# Patient Record
Sex: MALE | Race: WHITE | NOT HISPANIC OR LATINO | Employment: OTHER | URBAN - METROPOLITAN AREA
[De-identification: names, ages, dates, MRNs, and addresses within clinical notes are randomized per-mention and may not be internally consistent; named-entity substitution may affect disease eponyms.]

---

## 2021-04-14 DIAGNOSIS — Z23 ENCOUNTER FOR IMMUNIZATION: ICD-10-CM

## 2021-08-24 ENCOUNTER — APPOINTMENT (OUTPATIENT)
Dept: PREADMISSION TESTING | Facility: HOSPITAL | Age: 86
End: 2021-08-24
Payer: MEDICARE

## 2021-08-26 ENCOUNTER — APPOINTMENT (OUTPATIENT)
Dept: PREADMISSION TESTING | Facility: HOSPITAL | Age: 86
End: 2021-08-26
Payer: MEDICARE

## 2021-08-26 VITALS — BODY MASS INDEX: 21.67 KG/M2 | WEIGHT: 160 LBS | HEIGHT: 72 IN

## 2021-08-26 RX ORDER — PHENOL/SODIUM PHENOLATE
20 AEROSOL, SPRAY (ML) MUCOUS MEMBRANE
COMMUNITY

## 2021-08-26 RX ORDER — LISINOPRIL 10 MG/1
10 TABLET ORAL EVERY EVENING
COMMUNITY

## 2021-08-26 RX ORDER — AMLODIPINE BESYLATE 5 MG/1
5 TABLET ORAL NIGHTLY
COMMUNITY

## 2021-08-26 RX ORDER — ZOLPIDEM TARTRATE 10 MG/1
10 TABLET ORAL NIGHTLY
COMMUNITY

## 2021-08-26 RX ORDER — ALPRAZOLAM 0.5 MG/1
0.5 TABLET ORAL NIGHTLY
COMMUNITY

## 2021-08-26 RX ORDER — TRAMADOL HYDROCHLORIDE 50 MG/1
50 TABLET ORAL EVERY 6 HOURS PRN
COMMUNITY

## 2021-08-27 ENCOUNTER — ANESTHESIA EVENT (OUTPATIENT)
Dept: OPERATING ROOM | Facility: HOSPITAL | Age: 86
Setting detail: HOSPITAL OUTPATIENT SURGERY
DRG: 470 | End: 2021-08-27
Payer: MEDICARE

## 2021-08-27 ENCOUNTER — APPOINTMENT (OUTPATIENT)
Dept: PREADMISSION TESTING | Facility: HOSPITAL | Age: 86
End: 2021-08-27
Attending: PHYSICIAN ASSISTANT
Payer: MEDICARE

## 2021-08-27 ENCOUNTER — APPOINTMENT (OUTPATIENT)
Dept: LAB | Facility: HOSPITAL | Age: 86
End: 2021-08-27
Attending: PHYSICIAN ASSISTANT
Payer: MEDICARE

## 2021-08-27 ENCOUNTER — TRANSCRIBE ORDERS (OUTPATIENT)
Dept: REGISTRATION | Facility: HOSPITAL | Age: 86
End: 2021-08-27

## 2021-08-27 DIAGNOSIS — Z11.59 ENCOUNTER FOR SCREENING FOR OTHER VIRAL DISEASES: ICD-10-CM

## 2021-08-27 DIAGNOSIS — Z11.59 ENCOUNTER FOR SCREENING FOR OTHER VIRAL DISEASES: Primary | ICD-10-CM

## 2021-08-27 DIAGNOSIS — Z01.818 ENCOUNTER FOR OTHER PREPROCEDURAL EXAMINATION: ICD-10-CM

## 2021-08-27 LAB
ABO + RH BLD: NORMAL
ALBUMIN SERPL-MCNC: 3.8 G/DL (ref 3.4–5)
ALP SERPL-CCNC: 77 IU/L (ref 35–126)
ALT SERPL-CCNC: 23 IU/L (ref 16–63)
ANION GAP SERPL CALC-SCNC: 10 MEQ/L (ref 3–15)
APTT PPP: 30 SEC (ref 23–35)
AST SERPL-CCNC: 30 IU/L (ref 15–41)
BILIRUB SERPL-MCNC: 1.1 MG/DL (ref 0.3–1.2)
BLD GP AB SCN SERPL QL: NEGATIVE
BLOOD BANK CMNT PATIENT-IMP: NORMAL
BUN SERPL-MCNC: 15 MG/DL (ref 8–20)
CALCIUM SERPL-MCNC: 9.5 MG/DL (ref 8.9–10.3)
CHLORIDE SERPL-SCNC: 102 MEQ/L (ref 98–109)
CO2 SERPL-SCNC: 22 MEQ/L (ref 22–32)
CREAT SERPL-MCNC: 0.9 MG/DL (ref 0.8–1.3)
D AG BLD QL: POSITIVE
ERYTHROCYTE [DISTWIDTH] IN BLOOD BY AUTOMATED COUNT: 13.6 % (ref 11.6–14.4)
EST. AVERAGE GLUCOSE BLD GHB EST-MCNC: 103 MG/DL
GFR SERPL CREATININE-BSD FRML MDRD: >60 ML/MIN/1.73M*2
GLUCOSE SERPL-MCNC: 87 MG/DL (ref 70–99)
HBA1C MFR BLD HPLC: 5.2 %
HCT VFR BLDCO AUTO: 44.3 % (ref 40.1–51)
HGB BLD-MCNC: 15 G/DL (ref 13.7–17.5)
INR PPP: 1
LABORATORY COMMENT REPORT: NORMAL
MCH RBC QN AUTO: 32.5 PG (ref 28–33.2)
MCHC RBC AUTO-ENTMCNC: 33.9 G/DL (ref 32.2–36.5)
MCV RBC AUTO: 96.1 FL (ref 83–98)
PDW BLD AUTO: 11.3 FL (ref 9.4–12.4)
PLATELET # BLD AUTO: 191 K/UL (ref 150–350)
POTASSIUM SERPL-SCNC: 4.7 MEQ/L (ref 3.6–5.1)
PROT SERPL-MCNC: 6 G/DL (ref 6–8.2)
PROTHROMBIN TIME: 13.1 SEC (ref 12.2–14.5)
RBC # BLD AUTO: 4.61 M/UL (ref 4.5–5.8)
SARS-COV-2 RNA RESP QL NAA+PROBE: NEGATIVE
SODIUM SERPL-SCNC: 134 MEQ/L (ref 136–144)
SPECIMEN EXP DATE BLD: NORMAL
WBC # BLD AUTO: 4.7 K/UL (ref 3.8–10.5)

## 2021-08-27 PROCEDURE — 83036 HEMOGLOBIN GLYCOSYLATED A1C: CPT | Mod: GA

## 2021-08-27 PROCEDURE — 85610 PROTHROMBIN TIME: CPT | Mod: GA

## 2021-08-27 PROCEDURE — 36415 COLL VENOUS BLD VENIPUNCTURE: CPT

## 2021-08-27 PROCEDURE — 85730 THROMBOPLASTIN TIME PARTIAL: CPT | Mod: GA

## 2021-08-27 PROCEDURE — 86850 RBC ANTIBODY SCREEN: CPT

## 2021-08-27 PROCEDURE — 80053 COMPREHEN METABOLIC PANEL: CPT

## 2021-08-27 PROCEDURE — U0003 INFECTIOUS AGENT DETECTION BY NUCLEIC ACID (DNA OR RNA); SEVERE ACUTE RESPIRATORY SYNDROME CORONAVIRUS 2 (SARS-COV-2) (CORONAVIRUS DISEASE [COVID-19]), AMPLIFIED PROBE TECHNIQUE, MAKING USE OF HIGH THROUGHPUT TECHNOLOGIES AS DESCRIBED BY CMS-2020-01-R: HCPCS

## 2021-08-27 PROCEDURE — 85027 COMPLETE CBC AUTOMATED: CPT

## 2021-08-31 ENCOUNTER — HOSPITAL ENCOUNTER (INPATIENT)
Facility: HOSPITAL | Age: 86
LOS: 1 days | Discharge: HOME HEALTH | DRG: 470 | End: 2021-09-02
Attending: ORTHOPAEDIC SURGERY | Admitting: ORTHOPAEDIC SURGERY
Payer: MEDICARE

## 2021-08-31 ENCOUNTER — APPOINTMENT (OUTPATIENT)
Dept: RADIOLOGY | Facility: HOSPITAL | Age: 86
DRG: 470 | End: 2021-08-31
Attending: ORTHOPAEDIC SURGERY
Payer: MEDICARE

## 2021-08-31 ENCOUNTER — ANESTHESIA (OUTPATIENT)
Dept: OPERATING ROOM | Facility: HOSPITAL | Age: 86
Setting detail: HOSPITAL OUTPATIENT SURGERY
DRG: 470 | End: 2021-08-31
Payer: MEDICARE

## 2021-08-31 LAB
ABO + RH BLD: NORMAL
D AG BLD QL: POSITIVE
LABORATORY COMMENT REPORT: NORMAL

## 2021-08-31 PROCEDURE — 63600000 HC DRUGS/DETAIL CODE: Performed by: ORTHOPAEDIC SURGERY

## 2021-08-31 PROCEDURE — 25000000 HC PHARMACY GENERAL: Performed by: ORTHOPAEDIC SURGERY

## 2021-08-31 PROCEDURE — 25000000 HC PHARMACY GENERAL: Performed by: ANESTHESIOLOGY

## 2021-08-31 PROCEDURE — 0SR90JZ REPLACEMENT OF RIGHT HIP JOINT WITH SYNTHETIC SUBSTITUTE, OPEN APPROACH: ICD-10-PCS | Performed by: ORTHOPAEDIC SURGERY

## 2021-08-31 PROCEDURE — 63700000 HC SELF-ADMINISTRABLE DRUG: Performed by: ORTHOPAEDIC SURGERY

## 2021-08-31 PROCEDURE — 36000005 HC OR LEVEL 5 INITIAL 30MIN: Performed by: ORTHOPAEDIC SURGERY

## 2021-08-31 PROCEDURE — 27200000 HC STERILE SUPPLY: Performed by: ORTHOPAEDIC SURGERY

## 2021-08-31 PROCEDURE — 71000001 HC PACU PHASE 1 INITIAL 30MIN: Performed by: ORTHOPAEDIC SURGERY

## 2021-08-31 PROCEDURE — 36415 COLL VENOUS BLD VENIPUNCTURE: CPT | Performed by: ORTHOPAEDIC SURGERY

## 2021-08-31 PROCEDURE — 25800000 HC PHARMACY IV SOLUTIONS: Performed by: ORTHOPAEDIC SURGERY

## 2021-08-31 PROCEDURE — 71000011 HC PACU PHASE 1 EA ADDL MIN: Performed by: ORTHOPAEDIC SURGERY

## 2021-08-31 PROCEDURE — C1776 JOINT DEVICE (IMPLANTABLE): HCPCS | Performed by: ORTHOPAEDIC SURGERY

## 2021-08-31 PROCEDURE — C1713 ANCHOR/SCREW BN/BN,TIS/BN: HCPCS | Performed by: ORTHOPAEDIC SURGERY

## 2021-08-31 PROCEDURE — 36000015 HC OR LEVEL 5 EA ADDL MIN: Performed by: ORTHOPAEDIC SURGERY

## 2021-08-31 PROCEDURE — 37000002 HC ANESTHESIA MAC: Performed by: ORTHOPAEDIC SURGERY

## 2021-08-31 PROCEDURE — 63700000 HC SELF-ADMINISTRABLE DRUG: Performed by: HOSPITALIST

## 2021-08-31 PROCEDURE — 72170 X-RAY EXAM OF PELVIS: CPT

## 2021-08-31 PROCEDURE — 97162 PT EVAL MOD COMPLEX 30 MIN: CPT | Mod: GP

## 2021-08-31 PROCEDURE — 63600000 HC DRUGS/DETAIL CODE: Mod: JW | Performed by: NURSE ANESTHETIST, CERTIFIED REGISTERED

## 2021-08-31 PROCEDURE — 25000000 HC PHARMACY GENERAL: Performed by: NURSE ANESTHETIST, CERTIFIED REGISTERED

## 2021-08-31 DEVICE — PINNACLE HIP SOLUTIONS ALTRX LD POLYETHYLENE ACETABULAR LINER +4 10 DEGREE 40MM ID 56MM OD
Type: IMPLANTABLE DEVICE | Site: HIP | Status: FUNCTIONAL
Brand: PINNACLE ALTRX

## 2021-08-31 DEVICE — CORAIL HIP SYSTEM CEMENTLESS FEMORAL STEM 12/14 AMT 125 DEGREES KLA SIZE 15 HA COATED HIGH OFFSET COLLAR
Type: IMPLANTABLE DEVICE | Site: HIP | Status: FUNCTIONAL
Brand: CORAIL

## 2021-08-31 DEVICE — M-SPEC METAL FEMORAL HEAD 12/14 TAPER DIAMETER 40MM +1.5 OFFSET: Type: IMPLANTABLE DEVICE | Site: HIP | Status: FUNCTIONAL

## 2021-08-31 DEVICE — PINNACLE CANCELLOUS BONE SCREW 6.5MM X 30MM
Type: IMPLANTABLE DEVICE | Site: HIP | Status: FUNCTIONAL
Brand: PINNACLE

## 2021-08-31 DEVICE — PINNACLE POROCOAT ACETABULAR SHELL SECTOR II 56MM OD
Type: IMPLANTABLE DEVICE | Site: HIP | Status: FUNCTIONAL
Brand: PINNACLE POROCOAT

## 2021-08-31 RX ORDER — AMOXICILLIN 250 MG
1 CAPSULE ORAL 2 TIMES DAILY
Status: DISCONTINUED | OUTPATIENT
Start: 2021-08-31 | End: 2021-09-02 | Stop reason: HOSPADM

## 2021-08-31 RX ORDER — BUPIVACAINE HYDROCHLORIDE 7.5 MG/ML
INJECTION, SOLUTION INTRASPINAL
Status: COMPLETED | OUTPATIENT
Start: 2021-08-31 | End: 2021-08-31

## 2021-08-31 RX ORDER — FAMOTIDINE 20 MG/1
20 TABLET, FILM COATED ORAL
Status: COMPLETED | OUTPATIENT
Start: 2021-08-31 | End: 2021-08-31

## 2021-08-31 RX ORDER — IBUPROFEN 200 MG
16-32 TABLET ORAL AS NEEDED
Status: DISCONTINUED | OUTPATIENT
Start: 2021-08-31 | End: 2021-08-31 | Stop reason: HOSPADM

## 2021-08-31 RX ORDER — ONDANSETRON HYDROCHLORIDE 2 MG/ML
4 INJECTION, SOLUTION INTRAVENOUS EVERY 8 HOURS PRN
Status: DISCONTINUED | OUTPATIENT
Start: 2021-08-31 | End: 2021-09-02 | Stop reason: HOSPADM

## 2021-08-31 RX ORDER — HYDROMORPHONE HYDROCHLORIDE 1 MG/ML
0.25 INJECTION, SOLUTION INTRAMUSCULAR; INTRAVENOUS; SUBCUTANEOUS EVERY 4 HOURS PRN
Status: DISCONTINUED | OUTPATIENT
Start: 2021-08-31 | End: 2021-09-01

## 2021-08-31 RX ORDER — DEXTROSE 50 % IN WATER (D50W) INTRAVENOUS SYRINGE
25 AS NEEDED
Status: DISCONTINUED | OUTPATIENT
Start: 2021-08-31 | End: 2021-09-02 | Stop reason: HOSPADM

## 2021-08-31 RX ORDER — DEXTROSE 40 %
15-30 GEL (GRAM) ORAL AS NEEDED
Status: DISCONTINUED | OUTPATIENT
Start: 2021-08-31 | End: 2021-08-31 | Stop reason: HOSPADM

## 2021-08-31 RX ORDER — FENTANYL CITRATE 50 UG/ML
INJECTION, SOLUTION INTRAMUSCULAR; INTRAVENOUS AS NEEDED
Status: DISCONTINUED | OUTPATIENT
Start: 2021-08-31 | End: 2021-08-31 | Stop reason: SURG

## 2021-08-31 RX ORDER — CEFAZOLIN SODIUM/WATER 2 G/20 ML
2 SYRINGE (ML) INTRAVENOUS
Status: COMPLETED | OUTPATIENT
Start: 2021-08-31 | End: 2021-08-31

## 2021-08-31 RX ORDER — IBUPROFEN 200 MG
16-32 TABLET ORAL AS NEEDED
Status: DISCONTINUED | OUTPATIENT
Start: 2021-08-31 | End: 2021-09-02 | Stop reason: HOSPADM

## 2021-08-31 RX ORDER — OXYCODONE HYDROCHLORIDE 5 MG/1
5-10 TABLET ORAL EVERY 4 HOURS PRN
Status: DISCONTINUED | OUTPATIENT
Start: 2021-08-31 | End: 2021-09-01

## 2021-08-31 RX ORDER — ALUMINUM HYDROXIDE, MAGNESIUM HYDROXIDE, AND SIMETHICONE 1200; 120; 1200 MG/30ML; MG/30ML; MG/30ML
30 SUSPENSION ORAL EVERY 4 HOURS PRN
Status: DISCONTINUED | OUTPATIENT
Start: 2021-08-31 | End: 2021-09-02 | Stop reason: HOSPADM

## 2021-08-31 RX ORDER — PHENYLEPHRINE HYDROCHLORIDE 10 MG/ML
INJECTION INTRAVENOUS AS NEEDED
Status: DISCONTINUED | OUTPATIENT
Start: 2021-08-31 | End: 2021-08-31 | Stop reason: SURG

## 2021-08-31 RX ORDER — DEXTROSE 40 %
15-30 GEL (GRAM) ORAL AS NEEDED
Status: DISCONTINUED | OUTPATIENT
Start: 2021-08-31 | End: 2021-09-02 | Stop reason: HOSPADM

## 2021-08-31 RX ORDER — SODIUM CHLORIDE 0.9 G/100ML
INJECTION, SOLUTION IRRIGATION AS NEEDED
Status: DISCONTINUED | OUTPATIENT
Start: 2021-08-31 | End: 2021-08-31 | Stop reason: HOSPADM

## 2021-08-31 RX ORDER — ONDANSETRON 8 MG/1
8 TABLET, ORALLY DISINTEGRATING ORAL
Status: COMPLETED | OUTPATIENT
Start: 2021-08-31 | End: 2021-08-31

## 2021-08-31 RX ORDER — PREGABALIN 150 MG/1
150 CAPSULE ORAL
Status: COMPLETED | OUTPATIENT
Start: 2021-08-31 | End: 2021-08-31

## 2021-08-31 RX ORDER — OXYCODONE HCL 20 MG/1
20 TABLET, FILM COATED, EXTENDED RELEASE ORAL
Status: COMPLETED | OUTPATIENT
Start: 2021-08-31 | End: 2021-08-31

## 2021-08-31 RX ORDER — PANTOPRAZOLE SODIUM 20 MG/1
20 TABLET, DELAYED RELEASE ORAL DAILY
Status: DISCONTINUED | OUTPATIENT
Start: 2021-09-01 | End: 2021-09-02 | Stop reason: HOSPADM

## 2021-08-31 RX ORDER — FENTANYL CITRATE 50 UG/ML
50 INJECTION, SOLUTION INTRAMUSCULAR; INTRAVENOUS
Status: DISCONTINUED | OUTPATIENT
Start: 2021-08-31 | End: 2021-08-31 | Stop reason: HOSPADM

## 2021-08-31 RX ORDER — HYDROMORPHONE HYDROCHLORIDE 1 MG/ML
0.5 INJECTION, SOLUTION INTRAMUSCULAR; INTRAVENOUS; SUBCUTANEOUS
Status: DISCONTINUED | OUTPATIENT
Start: 2021-08-31 | End: 2021-08-31 | Stop reason: HOSPADM

## 2021-08-31 RX ORDER — SODIUM CHLORIDE 9 MG/ML
INJECTION, SOLUTION INTRAVENOUS CONTINUOUS
Status: DISCONTINUED | OUTPATIENT
Start: 2021-08-31 | End: 2021-09-02 | Stop reason: HOSPADM

## 2021-08-31 RX ORDER — PROPOFOL 10 MG/ML
INJECTION, EMULSION INTRAVENOUS CONTINUOUS PRN
Status: DISCONTINUED | OUTPATIENT
Start: 2021-08-31 | End: 2021-08-31 | Stop reason: SURG

## 2021-08-31 RX ORDER — DEXTROSE 50 % IN WATER (D50W) INTRAVENOUS SYRINGE
25 AS NEEDED
Status: DISCONTINUED | OUTPATIENT
Start: 2021-08-31 | End: 2021-08-31 | Stop reason: HOSPADM

## 2021-08-31 RX ORDER — DEXAMETHASONE SODIUM PHOSPHATE 4 MG/ML
10 INJECTION, SOLUTION INTRA-ARTICULAR; INTRALESIONAL; INTRAMUSCULAR; INTRAVENOUS; SOFT TISSUE DAILY
Status: COMPLETED | OUTPATIENT
Start: 2021-08-31 | End: 2021-09-02

## 2021-08-31 RX ORDER — SODIUM CHLORIDE 9 MG/ML
INJECTION, SOLUTION INTRAVENOUS CONTINUOUS
Status: ACTIVE | OUTPATIENT
Start: 2021-08-31 | End: 2021-09-01

## 2021-08-31 RX ORDER — TRAMADOL HYDROCHLORIDE 50 MG/1
50 TABLET ORAL 4 TIMES DAILY
Status: DISCONTINUED | OUTPATIENT
Start: 2021-08-31 | End: 2021-09-02 | Stop reason: HOSPADM

## 2021-08-31 RX ORDER — CELECOXIB 200 MG/1
400 CAPSULE ORAL
Status: COMPLETED | OUTPATIENT
Start: 2021-08-31 | End: 2021-08-31

## 2021-08-31 RX ORDER — POLYETHYLENE GLYCOL 3350 17 G/17G
17 POWDER, FOR SOLUTION ORAL DAILY
Status: DISCONTINUED | OUTPATIENT
Start: 2021-08-31 | End: 2021-09-02 | Stop reason: HOSPADM

## 2021-08-31 RX ORDER — NAPROXEN SODIUM 220 MG/1
81 TABLET, FILM COATED ORAL 2 TIMES DAILY
Status: DISCONTINUED | OUTPATIENT
Start: 2021-08-31 | End: 2021-09-02 | Stop reason: HOSPADM

## 2021-08-31 RX ORDER — LISINOPRIL 10 MG/1
10 TABLET ORAL EVERY EVENING
Status: DISCONTINUED | OUTPATIENT
Start: 2021-08-31 | End: 2021-09-02 | Stop reason: HOSPADM

## 2021-08-31 RX ORDER — ZOLPIDEM TARTRATE 5 MG/1
5 TABLET ORAL NIGHTLY PRN
Status: DISCONTINUED | OUTPATIENT
Start: 2021-08-31 | End: 2021-09-02 | Stop reason: HOSPADM

## 2021-08-31 RX ORDER — ALPRAZOLAM 0.5 MG/1
0.5 TABLET ORAL NIGHTLY
Status: DISCONTINUED | OUTPATIENT
Start: 2021-08-31 | End: 2021-09-02 | Stop reason: HOSPADM

## 2021-08-31 RX ORDER — AMLODIPINE BESYLATE 5 MG/1
5 TABLET ORAL NIGHTLY
Status: DISCONTINUED | OUTPATIENT
Start: 2021-08-31 | End: 2021-09-02 | Stop reason: HOSPADM

## 2021-08-31 RX ORDER — ONDANSETRON 4 MG/1
4 TABLET, ORALLY DISINTEGRATING ORAL EVERY 8 HOURS PRN
Status: DISCONTINUED | OUTPATIENT
Start: 2021-08-31 | End: 2021-09-02 | Stop reason: HOSPADM

## 2021-08-31 RX ORDER — KETOROLAC TROMETHAMINE 15 MG/ML
15 INJECTION, SOLUTION INTRAMUSCULAR; INTRAVENOUS
Status: DISCONTINUED | OUTPATIENT
Start: 2021-08-31 | End: 2021-09-02 | Stop reason: HOSPADM

## 2021-08-31 RX ADMIN — TRAMADOL HYDROCHLORIDE 50 MG: 50 TABLET, FILM COATED ORAL at 22:57

## 2021-08-31 RX ADMIN — AMLODIPINE BESYLATE 5 MG: 5 TABLET ORAL at 22:57

## 2021-08-31 RX ADMIN — ONDANSETRON 8 MG: 8 TABLET, ORALLY DISINTEGRATING ORAL at 11:10

## 2021-08-31 RX ADMIN — CEFAZOLIN SODIUM 2 G: 10 POWDER, FOR SOLUTION INTRAVENOUS at 21:52

## 2021-08-31 RX ADMIN — PHENYLEPHRINE HYDROCHLORIDE 200 MCG: 10 INJECTION INTRAVENOUS at 13:20

## 2021-08-31 RX ADMIN — HYDROMORPHONE HYDROCHLORIDE 0.25 MG: 1 INJECTION, SOLUTION INTRAMUSCULAR; INTRAVENOUS; SUBCUTANEOUS at 20:38

## 2021-08-31 RX ADMIN — PHENYLEPHRINE HYDROCHLORIDE 200 MCG: 10 INJECTION INTRAVENOUS at 13:50

## 2021-08-31 RX ADMIN — SODIUM CHLORIDE: 9 INJECTION, SOLUTION INTRAVENOUS at 11:11

## 2021-08-31 RX ADMIN — SODIUM CHLORIDE: 9 INJECTION, SOLUTION INTRAVENOUS at 14:58

## 2021-08-31 RX ADMIN — KETOROLAC TROMETHAMINE 15 MG: 15 INJECTION, SOLUTION INTRAMUSCULAR; INTRAVENOUS at 17:14

## 2021-08-31 RX ADMIN — PREGABALIN 150 MG: 150 CAPSULE ORAL at 11:10

## 2021-08-31 RX ADMIN — POLYETHYLENE GLYCOL 3350 17 G: 17 POWDER, FOR SOLUTION ORAL at 20:38

## 2021-08-31 RX ADMIN — ALPRAZOLAM 0.5 MG: 0.5 TABLET ORAL at 22:57

## 2021-08-31 RX ADMIN — FAMOTIDINE 20 MG: 20 TABLET ORAL at 11:10

## 2021-08-31 RX ADMIN — BUPIVACAINE HYDROCHLORIDE IN DEXTROSE 1.6 ML: 7.5 INJECTION, SOLUTION SUBARACHNOID at 13:00

## 2021-08-31 RX ADMIN — CELECOXIB 400 MG: 200 CAPSULE ORAL at 11:10

## 2021-08-31 RX ADMIN — VANCOMYCIN HYDROCHLORIDE 1 G: 1 INJECTION, POWDER, LYOPHILIZED, FOR SOLUTION INTRAVENOUS at 22:57

## 2021-08-31 RX ADMIN — TRANEXAMIC ACID 1400 MG: 100 INJECTION, SOLUTION INTRAVENOUS at 12:54

## 2021-08-31 RX ADMIN — FENTANYL CITRATE 50 MCG: 50 INJECTION INTRAMUSCULAR; INTRAVENOUS at 13:00

## 2021-08-31 RX ADMIN — PHENYLEPHRINE HYDROCHLORIDE 200 MCG: 10 INJECTION INTRAVENOUS at 13:40

## 2021-08-31 RX ADMIN — CEFAZOLIN 2 G: 330 INJECTION, POWDER, FOR SOLUTION INTRAMUSCULAR; INTRAVENOUS at 13:05

## 2021-08-31 RX ADMIN — DOCUSATE SODIUM AND SENNOSIDES 1 TABLET: 8.6; 5 TABLET, FILM COATED ORAL at 20:38

## 2021-08-31 RX ADMIN — DEXAMETHASONE SODIUM PHOSPHATE 10 MG: 4 INJECTION, SOLUTION INTRA-ARTICULAR; INTRALESIONAL; INTRAMUSCULAR; INTRAVENOUS; SOFT TISSUE at 15:42

## 2021-08-31 RX ADMIN — OXYCODONE HYDROCHLORIDE 20 MG: 20 TABLET, FILM COATED, EXTENDED RELEASE ORAL at 11:09

## 2021-08-31 RX ADMIN — VANCOMYCIN HYDROCHLORIDE 1000 MG: 1 INJECTION, POWDER, LYOPHILIZED, FOR SOLUTION INTRAVENOUS at 11:12

## 2021-08-31 RX ADMIN — PHENYLEPHRINE HYDROCHLORIDE 200 MCG: 10 INJECTION INTRAVENOUS at 13:13

## 2021-08-31 RX ADMIN — LISINOPRIL 10 MG: 10 TABLET ORAL at 22:56

## 2021-08-31 RX ADMIN — KETOROLAC TROMETHAMINE 15 MG: 15 INJECTION, SOLUTION INTRAMUSCULAR; INTRAVENOUS at 22:57

## 2021-08-31 RX ADMIN — PROPOFOL 50 MCG/KG/MIN: 10 INJECTION, EMULSION INTRAVENOUS at 13:10

## 2021-08-31 ASSESSMENT — COGNITIVE AND FUNCTIONAL STATUS - GENERAL
WALKING IN HOSPITAL ROOM: 2 - A LOT
MOVING TO AND FROM BED TO CHAIR: 3 - A LITTLE
CLIMB 3 TO 5 STEPS WITH RAILING: 2 - A LOT
AFFECT: WFL
STANDING UP FROM CHAIR USING ARMS: 3 - A LITTLE

## 2021-08-31 ASSESSMENT — PATIENT HEALTH QUESTIONNAIRE - PHQ9: SUM OF ALL RESPONSES TO PHQ9 QUESTIONS 1 & 2: 0

## 2021-08-31 NOTE — PLAN OF CARE
Problem: Adult Inpatient Plan of Care  Goal: Plan of Care Review  Outcome: Progressing  Flowsheets (Taken 8/31/2021 1939)  Progress: improving  Plan of Care Reviewed With: patient  Outcome Summary: PT eval completed. home with home PT     Problem: Joint Function Impaired (Hip Arthroplasty)  Goal: Optimal Functional Ability  Outcome: Progressing

## 2021-08-31 NOTE — NURSING NOTE
Pt arrived on the floor from PACU. Pt alert and oriented. VSS. Afebrile. Neurovascular assessment unchanged. R. Hip dressing remians clean, dry and intact. Pt teaching provided for IS. Bed alarm on.

## 2021-08-31 NOTE — PROGRESS NOTES
Patient: Neno Araujo  Location:  [facility] 4B 4229  MRN:  [id number]  Today's date:  8/31/2021    Pt left supine in bed and resting comfortably with all lines arranged, incontinence pad underneath, SCD's in place, call bell and personal items within reach.Nurse aware of pt's performance and positioning.       Neno is a 88 y.o. male admitted on 8/31/2021 with Osteoarthritis of right hip, unspecified osteoarthritis type [M16.11]  Arthritis of hip [M16.10]. Principal problem is R JOSE.  Past Medical History  Neno has a past medical history of Arthritis, Duodenal ulcer, Hypertension, Osteoarthritis of left hip, and Spinal stenosis, lumbar.    History of Present Illness  8/31/21: right total  hip  replacement. WBAT. Hip precautions    PT Vitals    Date/Time Pulse HR Source Resp SpO2 BP MAP BP Location BP Method Pt Position [facility]   08/31/21 1645 86 -- -- 97 % 136/94 -- Right upper arm Automatic Lying [facility]   08/31/21 1659 100 -- -- 90 % 152/99 -- Right upper arm Automatic Lying [facility]   08/31/21 1700 94 Monitor 62 93 % 156/101 123 mmHg -- -- -- JK      PT Pain    Date/Time Pain Type Rating: Rest Rating: Activity Interventions [facility]   08/31/21 1645 Pain Assessment 3 3 -- [facility]   08/31/21 1659 Pain Assessment 3 3 -- [facility]   08/31/21 1700 Pain Assessment 3 3 medication offered but refused;pain management plan reviewed with patient/caregiver;quiet environment facilitated;relaxation techniques promoted JK          Prior Living Environment      Most Recent Value   Current Living Arrangements  home/apartment/condo   Living Environment Comment  Pt reports living in 2 story house with spouse 0 steps to enter. 1/2 bath first floor. full bath/bedroom upstairs with full flight.        Prior Level of Function      Most Recent Value   Dominant Hand  right   Ambulation  assistive equipment   Transferring  assistive equipment   Toileting  independent   Bathing  independent   Dressing  independent   Eating  independent   Communication   understands/communicates without difficulty   Prior Level of Function Comment  pt reports independnet for fxl mobility with SPC recenlty 2/2 pain otherwise no AD. Independent for ADLs   Assistive Device Currently Used at Home  cane, straight          PT Evaluation and Treatment - 08/31/21 1644        PT Time Calculation    Start Time  1644     Stop Time  1700     Time Calculation (min)  16 min        Session Details    Document Type  initial evaluation     Mode of Treatment  physical therapy        General Information    Patient Profile Reviewed  yes     Patient/Family/Caregiver Comments/Observations  RN cleared for PT in pacu     General Observations of Patient  Pt rec;d supine in bed agreeable to PT session     Existing Precautions/Restrictions  fall;hip;weight bearing     Limitations/Impairments  safety/cognitive        Weight-bearing Status    Right LE Weight-Bearing Status  weight-bearing as tolerated (WBAT)        Cognition/Psychosocial    Affect/Mental Status (Cognition)  WFL     Orientation Status (Cognition)  oriented x 4     Follows Commands (Cognition)  WFL        Sensory Assessment (Somatosensory)    Left LE Sensory Assessment  general sensation;intact     Right LE Sensory Assessment  general sensation;intact        Range of Motion (ROM)    Left Lower Extremity (ROM)  left LE ROM is WFL     Right Lower Extremity (ROM)  right LE ROM is WFL except;hip     Hip, Right (ROM)  0-90 observed fxnlly         Strength (Manual Muscle Testing)    Hip, Left (Strength)  3/5     Knee, Left (Strength)  3/5     Ankle, Left (Strength)  3+/5     Hip, Right (Strength)  3+/5     Knee, Right (Strength)  3+/5     Ankle, Right (Strength)  3+/5        Bed Mobility    [place], Supine to Sit  minimum assist (75% or more patient effort);1 person assist     [place], Sit to Supine  minimum assist (75% or more patient effort);1 person assist     Comment (Bed Mobility)  minAx1 to maintain hip precautions; penny provided at  trunk to obtain EOB position        Sit to Stand Transfer    [place], Sit to Stand Transfer  minimum assist (75% or more patient effort);1 person assist     Verbal Cues  hand placement;technique;safety     Assistive Device  walker, front-wheeled     Comment  from bed        Stand to Sit Transfer    [place], Stand to Sit Transfer  minimum assist (75% or more patient effort);1 person assist     Verbal Cues  hand placement;safety;technique     Assistive Device  walker, front-wheeled     Comment  to bed        Gait Training    [place], Gait  minimum assist (75% or more patient effort);2 person assist     Assistive Device  walker, front-wheeled     Distance in Feet  8 feet     Pattern (Gait)  step-to     Deviations/Abnormal Patterns (Gait)  step length decreased;gait speed decreased;david decreased;base of support, narrow;antalgic;ataxic;bilateral deviations     Bilateral Gait Deviations  heel strike decreased     Maintains Weight-bearing Status (Gait)  able to maintain     Comment (Gait/Stairs)  8'x1 ataxic gait pattern narrow BRETT crossing midline pt reported inc numbness/tingling with ambulation deferred further ambultaiton for safety.        Stairs Training    Comment  TBD        Safety Issues, Functional Mobility    Safety Issues Affecting Function (Mobility)  awareness of need for assistance;insight into deficits/self-awareness;positioning of assistive device;safety precaution awareness     Impairments Affecting Function (Mobility)  balance;endurance/activity tolerance;pain;strength;range of motion (ROM)     Comment, Safety Issues/Impairments (Mobility)  PT session limited by pt reporting inc numbness once in upright/standing position unable to mobilize further than 8'        Balance    Balance Assessment  sit to stand dynamic balance     Static Sitting Balance  WFL     Dynamic Sitting Balance  WFL     Sit to Stand Dynamic Balance  mild impairment     Static Standing Balance  mild impairment      Dynamic Standing Balance  moderate impairment     Comment, Balance  ataxic gait pattern narrow BRETT inc postrual sway ith poor use of UE on RW for stablity needing minAx2 for safety in standing        Motor Skills    Functional Endurance  fair        AM-PAC (TM) - Mobility (Current Function)    Turning from your back to your side while in a flat bed without using bedrails?  3 - A Little     Moving from lying on your back to sitting on the side of a flat bed without using bedrails?  3 - A Little     Moving to and from a bed to a chair?  3 - A Little     Standing up from a chair using your arms?  3 - A Little     To walk in a hospital room?  2 - A Lot     Climbing 3-5 steps with a railing?  2 - A Lot     AM-PAC (TM) Mobility Score  16                       Education Documentation  Activity, taught by Lexus Roque PT at 8/31/2021  7:40 PM.  Learner: Patient  Readiness: Acceptance  Method: Explanation  Response: Verbalizes Understanding  Comment: PT POC/goals, safe fxnl mobility, proper use of AD, safe transfers, hip precautions          PT Assessment/Plan      Most Recent Value   Daily Outcome Statement  Pt seen for PT eval. pt reports independent baseline currently minAx1 for bed mboility and sit to stand transfers and minAx2 for ambulation 8'x1 2/2 axatic gait pattern 2/2 pt reporting inc numbness with inc ambualation limiting overall session. Pt will benefit from contined skilled PT to maxmize fxnl mboilty and improve strength, balance, endurance, and safety prior to d/c. reccomend home with home PT when stable. at 08/31/2021 1644   PT Recommended Discharge Disposition  home with home health, home with assistance at 08/31/2021 1644   Rehab Potential  good, to achieve stated therapy goals at 08/31/2021 1644   Therapy Frequency  5 times/wk at 08/31/2021 1644   Planned Therapy Interventions  balance training, gait training, bed mobility training, strengthening, stair training, transfer training at 08/31/2021  1644   Anticipated Equipment Needs at Discharge (PT)  none at 08/31/2021 1644   Patient/Family Therapy Goals Statement  to go home at 08/31/2021 1644          PT Goals      Most Recent Value   Bed Mobility Goal 1   Activity/Assistive Device  bed mobility activities, all at 08/31/2021 1644   [place]  modified independence at 08/31/2021 1644   Time Frame  by discharge at 08/31/2021 1644   Transfer Goal 1   Activity/Assistive Device  all transfers, walker, front-wheeled at 08/31/2021 1644   [place]  supervision required at 08/31/2021 1644   Time Frame  by discharge at 08/31/2021 1644   Gait Training Goal 1   Activity/Assistive Device  gait (walking locomotion), assistive device use at 08/31/2021 1644   [place]  supervision required at 08/31/2021 1644   Distance  150 at 08/31/2021 1644   Time Frame  by discharge at 08/31/2021 1644   Stairs Goal 1   Activity/Assistive Device  stairs, all skills, assistive device use at 08/31/2021 1644   [place]  supervision required at 08/31/2021 1644   Number of Stairs  12 at 08/31/2021 1644   Time Frame  by discharge at 08/31/2021 1644

## 2021-08-31 NOTE — OR SURGEON
Pre-Procedure patient identification:  I am the primary operating surgeon/proceduralist and I have identified the patient and confirmed laterality is right on 08/31/21 at 12:11 PM Kamran Khan MD  Phone Number: [phone]

## 2021-08-31 NOTE — OP NOTE
Pre-op Diagnosis: Severe Arthritis of the right hip  Post-op Diagnosis: same    Procedure right total  hip  replacement    Surgeon: Kamran Khan MD  Assistant: Princess Rogers    Anesthesia : Regional    EBL: 50 cc    Specimen: None      Patient is taken to the operating room where regional anesthesia was instituted by the anesthesiologist. Next the patient was turned in the left lateral decubitus position, and the right hip was prepped and draped in the usual sterile fashion. Posterior approach was used the incision was carried down through the subcutaneous tissue down to the level of the fascia. Fascia was divided and retractors were placed. Subsequently short external rotators and the capsule were taken down. A pin was placed into the pelvis and a larry was made on the greater trochanter and the hip was measured for length and offset. The hip was then dislocated and a mid neck cut was made in standard manner. Serial reaming the acetabulum was performed up to a size 55 mm. Next the 56 mm acetabular cup was impacted into place. A single screw was added to augment the fixation in standard manner. The offset liner was impacted into place. Attention was now turned to the femur with serial broaching was performed up to a size 15. Trial reduction with a offset neck and a 1.5 x 40 mm ball head demonstrated satisfactory range of motion stability kinematics and leg length and offset reconstruction. These components were deemed acceptable and the trials removed from the femoral side and the final components were then impacted into place. The [place] total hip replacement system by [email or web address] was used in this case. The hip was then reduced once again satisfactory range of motion stability kinematics were observed. The wound was thouroughly irrigated, and then  the wound was closed with #2 Quill in the deep layer, O Quill in the subcutaneous layer, and then 3-0 Vicryl in the subcuticular layer. Closure was augmented  with Dermabond. It is important to note that prior to completion of closure  dilute Marcaine solution was infiltrated in the néstor-incisional tissues for enhanced pain control. The patient was placed into a sterile compressive dressing and then the patient was taken to the recovery room in stable condition. I was present for the entire case.

## 2021-08-31 NOTE — HOSPITAL COURSE
Neno is a 88 y.o. male admitted on 8/31/2021 with Osteoarthritis of right hip, unspecified osteoarthritis type [M16.11]  Arthritis of hip [M16.10]. Principal problem is R JOSE.  Past Medical History  Neno has a past medical history of Arthritis, Duodenal ulcer, Hypertension, Osteoarthritis of left hip, and Spinal stenosis, lumbar.    History of Present Illness  8/31/21: right total  hip  replacement. WBAT. Hip precautions

## 2021-08-31 NOTE — BRIEF OP NOTE
RIGHT HIP REPLACEMENT TOTAL (R) Procedure Note    Procedure:    RIGHT HIP REPLACEMENT TOTAL  CPT(R) Code:  89559 - [place] TOTAL HIP ARTHROPLASTY      Pre-op Diagnosis     * Osteoarthritis of right hip, unspecified osteoarthritis type [M16.11]       Post-op Diagnosis     * Osteoarthritis of right hip, unspecified osteoarthritis type [M16.11]    Surgeon(s) and Role:     * Kamran Khan MD - Primary     * Minesh Frias DO - Resident - Assisting    Anesthesia: Choice    Staff:   Circulator: Faith Lam RN  Scrub Person: Letitia Albarran RN; Bharathi Mcdowell RN  Registered Nurse First Assistant: Telma Rogers RN    Procedure Details   Right THR    Estimated Blood Loss: 50 mL    Specimens:                Order Name Source Comment Collection Info Order Time   REPEAT ABO/RH (TYPE CHECK) Blood, Venous  Collected By: Beena Hamlin RN 8/31/2021 10:53 AM     Release to patient   Immediate              Drains: * No LDAs found *    Implants:   Implant Name Type Inv. Item Serial No.  Lot No. LRB No. Used Action   CUP ACETABULAR PINNACLE SECTOR II 56MM - [id number] One piece acetabular cup CUP ACETABULAR PINNACLE SECTOR II 56MM  [employer] [id number] Right 1 Implanted   SCREW BONE CANCELLOUS 6.5 WAYLON MM 30MM - [id number] Acetabular screw SCREW BONE CANCELLOUS 6.5 WAYLON MM 30MM  [employer] [id number] Right 1 Implanted   PINNACLE ALTRX 10 DEGREE 40X56 - [id number] Acetabular cup liner PINNACLE ALTRX 10 DEGREE 40X56  [employer] [id number] Right 1 Implanted   STEM LAT FEMORAL WITH COLLAR - [id number] Femoral hip stem STEM LAT FEMORAL WITH COLLAR  [employer] [id number] Right 1 Implanted   HEAD FEMORAL M-SPEC 40MM 1.5 OFFSET - [id number] Femoral head HEAD FEMORAL M-SPEC 40MM 1.5 OFFSET  [employer] [id number] Right 1 Implanted              Complications:  None; patient tolerated the procedure well.           Disposition: PACU - hemodynamically stable.           Condition: stable    Erin  Kamran VILLASENOR MD  Phone Number: [phone]

## 2021-09-01 PROBLEM — G47.00 INSOMNIA: Status: ACTIVE | Noted: 2021-09-01

## 2021-09-01 PROBLEM — I10 HYPERTENSION: Status: ACTIVE | Noted: 2021-09-01

## 2021-09-01 PROBLEM — G25.81 RESTLESS LEGS SYNDROME (RLS): Status: ACTIVE | Noted: 2021-09-01

## 2021-09-01 PROBLEM — K26.9 DUODENAL ULCER: Status: ACTIVE | Noted: 2021-09-01

## 2021-09-01 LAB
ANION GAP SERPL CALC-SCNC: 7 MEQ/L (ref 3–15)
BUN SERPL-MCNC: 28 MG/DL (ref 8–20)
CALCIUM SERPL-MCNC: 7.8 MG/DL (ref 8.9–10.3)
CHLORIDE SERPL-SCNC: 106 MEQ/L (ref 98–109)
CO2 SERPL-SCNC: 22 MEQ/L (ref 22–32)
CREAT SERPL-MCNC: 0.8 MG/DL (ref 0.8–1.3)
GFR SERPL CREATININE-BSD FRML MDRD: >60 ML/MIN/1.73M*2
GLUCOSE SERPL-MCNC: 157 MG/DL (ref 70–99)
POTASSIUM SERPL-SCNC: 4.9 MEQ/L (ref 3.6–5.1)
SODIUM SERPL-SCNC: 135 MEQ/L (ref 136–144)

## 2021-09-01 PROCEDURE — 99218 PR INITIAL OBSERVATION CARE/DAY 30 MINUTES: CPT | Performed by: HOSPITALIST

## 2021-09-01 PROCEDURE — 97535 SELF CARE MNGMENT TRAINING: CPT | Mod: GO

## 2021-09-01 PROCEDURE — 97530 THERAPEUTIC ACTIVITIES: CPT | Mod: GP,CQ,59

## 2021-09-01 PROCEDURE — 97166 OT EVAL MOD COMPLEX 45 MIN: CPT | Mod: GO

## 2021-09-01 PROCEDURE — 63700000 HC SELF-ADMINISTRABLE DRUG: Performed by: HOSPITALIST

## 2021-09-01 PROCEDURE — 25800000 HC PHARMACY IV SOLUTIONS: Performed by: ORTHOPAEDIC SURGERY

## 2021-09-01 PROCEDURE — 63700000 HC SELF-ADMINISTRABLE DRUG: Performed by: ORTHOPAEDIC SURGERY

## 2021-09-01 PROCEDURE — 36415 COLL VENOUS BLD VENIPUNCTURE: CPT | Performed by: ORTHOPAEDIC SURGERY

## 2021-09-01 PROCEDURE — 80048 BASIC METABOLIC PNL TOTAL CA: CPT | Performed by: ORTHOPAEDIC SURGERY

## 2021-09-01 PROCEDURE — 63600000 HC DRUGS/DETAIL CODE: Performed by: ORTHOPAEDIC SURGERY

## 2021-09-01 PROCEDURE — 25000000 HC PHARMACY GENERAL: Performed by: ORTHOPAEDIC SURGERY

## 2021-09-01 PROCEDURE — 63700000 HC SELF-ADMINISTRABLE DRUG: Performed by: NURSE PRACTITIONER

## 2021-09-01 RX ORDER — HYDROMORPHONE HYDROCHLORIDE 1 MG/ML
0.25 INJECTION, SOLUTION INTRAMUSCULAR; INTRAVENOUS; SUBCUTANEOUS EVERY 4 HOURS PRN
Status: DISCONTINUED | OUTPATIENT
Start: 2021-09-01 | End: 2021-09-02 | Stop reason: HOSPADM

## 2021-09-01 RX ORDER — ACETAMINOPHEN 325 MG/1
650 TABLET ORAL EVERY 6 HOURS PRN
Status: DISCONTINUED | OUTPATIENT
Start: 2021-09-01 | End: 2021-09-02 | Stop reason: HOSPADM

## 2021-09-01 RX ORDER — POLYETHYLENE GLYCOL 3350 17 G/17G
17 POWDER, FOR SOLUTION ORAL DAILY
Qty: 89 PACKET | Refills: 0 | COMMUNITY
Start: 2021-09-02 | End: 2021-11-30

## 2021-09-01 RX ORDER — OXYCODONE HYDROCHLORIDE 5 MG/1
5-10 TABLET ORAL EVERY 4 HOURS PRN
Status: DISCONTINUED | OUTPATIENT
Start: 2021-09-01 | End: 2021-09-02 | Stop reason: HOSPADM

## 2021-09-01 RX ORDER — DOXYCYCLINE 100 MG/1
100 CAPSULE ORAL 2 TIMES DAILY
Qty: 14 CAPSULE | Refills: 0 | Status: SHIPPED | OUTPATIENT
Start: 2021-09-01 | End: 2021-09-08

## 2021-09-01 RX ORDER — NAPROXEN SODIUM 220 MG/1
81 TABLET, FILM COATED ORAL 2 TIMES DAILY
Qty: 180 TABLET | Refills: 0 | COMMUNITY
Start: 2021-09-01 | End: 2021-11-30

## 2021-09-01 RX ORDER — ACETAMINOPHEN 325 MG/1
650 TABLET ORAL EVERY 6 HOURS PRN
COMMUNITY
Start: 2021-09-01 | End: 2021-10-01

## 2021-09-01 RX ADMIN — TRAMADOL HYDROCHLORIDE 50 MG: 50 TABLET, FILM COATED ORAL at 20:59

## 2021-09-01 RX ADMIN — DOCUSATE SODIUM AND SENNOSIDES 1 TABLET: 8.6; 5 TABLET, FILM COATED ORAL at 08:49

## 2021-09-01 RX ADMIN — DOCUSATE SODIUM AND SENNOSIDES 1 TABLET: 8.6; 5 TABLET, FILM COATED ORAL at 20:59

## 2021-09-01 RX ADMIN — SODIUM CHLORIDE: 9 INJECTION, SOLUTION INTRAVENOUS at 03:03

## 2021-09-01 RX ADMIN — ASPIRIN 81 MG CHEWABLE TABLET 81 MG: 81 TABLET CHEWABLE at 20:59

## 2021-09-01 RX ADMIN — TRAMADOL HYDROCHLORIDE 50 MG: 50 TABLET, FILM COATED ORAL at 08:51

## 2021-09-01 RX ADMIN — ALPRAZOLAM 0.5 MG: 0.5 TABLET ORAL at 22:45

## 2021-09-01 RX ADMIN — ASPIRIN 81 MG CHEWABLE TABLET 81 MG: 81 TABLET CHEWABLE at 09:55

## 2021-09-01 RX ADMIN — AMLODIPINE BESYLATE 5 MG: 5 TABLET ORAL at 22:45

## 2021-09-01 RX ADMIN — CEFAZOLIN SODIUM 2 G: 10 POWDER, FOR SOLUTION INTRAVENOUS at 04:42

## 2021-09-01 RX ADMIN — OXYCODONE HYDROCHLORIDE 5 MG: 5 TABLET ORAL at 04:51

## 2021-09-01 RX ADMIN — LISINOPRIL 10 MG: 10 TABLET ORAL at 22:45

## 2021-09-01 RX ADMIN — ACETAMINOPHEN 650 MG: 325 TABLET ORAL at 16:40

## 2021-09-01 RX ADMIN — DEXAMETHASONE SODIUM PHOSPHATE 10 MG: 4 INJECTION, SOLUTION INTRA-ARTICULAR; INTRALESIONAL; INTRAMUSCULAR; INTRAVENOUS; SOFT TISSUE at 08:49

## 2021-09-01 RX ADMIN — PANTOPRAZOLE SODIUM 20 MG: 20 TABLET, DELAYED RELEASE ORAL at 08:49

## 2021-09-01 RX ADMIN — TRAMADOL HYDROCHLORIDE 50 MG: 50 TABLET, FILM COATED ORAL at 14:21

## 2021-09-01 ASSESSMENT — COGNITIVE AND FUNCTIONAL STATUS - GENERAL
DRESSING REGULAR LOWER BODY CLOTHING: 3 - A LITTLE
TOILETING: 3 - A LITTLE
CLIMB 3 TO 5 STEPS WITH RAILING: 1 - TOTAL
WALKING IN HOSPITAL ROOM: 3 - A LITTLE
HELP NEEDED FOR BATHING: 3 - A LITTLE
MOVING TO AND FROM BED TO CHAIR: 3 - A LITTLE
DRESSING REGULAR UPPER BODY CLOTHING: 4 - NONE
HELP NEEDED FOR PERSONAL GROOMING: 4 - NONE
EATING MEALS: 4 - NONE
CLIMB 3 TO 5 STEPS WITH RAILING: 2 - A LOT
STANDING UP FROM CHAIR USING ARMS: 3 - A LITTLE
STANDING UP FROM CHAIR USING ARMS: 2 - A LOT
MOVING TO AND FROM BED TO CHAIR: 3 - A LITTLE
WALKING IN HOSPITAL ROOM: 3 - A LITTLE

## 2021-09-01 NOTE — ASSESSMENT & PLAN NOTE
Takes amlodipine, lisinopril at home  - systolic blood pressures 125 - 156 post operatively    Plan: Continue amlodipine, lisinopril           Monitor vital signs    Stable

## 2021-09-01 NOTE — PROGRESS NOTES
Ortho Daily Progress Note    Subjective     Subjective:   Patient seen and examined at bedside. Patient report no acute events overnight. Pain is well controlled. Patient denies F/C, SOB, CP. Patient has no new complaints at this time.     Objective     Vital signs in last 24 hours:  Temp:  [36.2 °C (97.2 °F)-36.7 °C (98.1 °F)] 36.7 °C (98.1 °F)  Heart Rate:  [] 97  Resp:  [10-62] 18  BP: (100-156)/() 102/70      Intake/Output Summary (Last 24 hours) at 9/1/2021 0635  Last data filed at 9/1/2021 0512  Gross per 24 hour   Intake 754.49 ml   Output 300 ml   Net 454.49 ml     Intake/Output this shift:  I/O this shift:  In: 420 [P.O.:120; IV Piggyback:300]  Out: 250 [Urine:250]    Labs  Labs are pending.    Imaging  Post op xray pelvis: implants in good position, no fracture, no dislocation    Physical Exam:  RLE:  Dressing clean, dry and intact  Compartments soft and compressible  Sensation intact to light touch tibial, sural, saphenous, superficial peroneal, and deep peroneal nerves  Motor intact ankle plantar/dorsiflexion, great toe flexion/extension  Palpable DP/PT pulses  Foot warm and well-perfused, brisk capillary refill, less than 2 seconds    Assessment/Plan:     88 y.o. y/o male  s/p R JOSE with Dr. Khan DOS: 8/31/2021  -Ancef x24 hours  -Multimodal pain control  -PT/OT  -WBAT RLE  -AM Hgb pending  -Maintain dressing  -Ice  -ASA 81BID for DVT PPx  -Dispo: Pending PT eval

## 2021-09-01 NOTE — CONSULTS
Hospital Medicine Service -  IP Medical Consult       CHIEF COMPLAINT     Osteoathritis right hip - Right Total hip replacement 8/31     HISTORY OF PRESENT ILLNESS      88 y.o. male with a past medical history of osteoarthritis Right hip, HTN, insomnia, restless leg syndrome, duodenal ulcer and spinal stenosis that came in to [facility] for a Right Total hip replacement 8/31 by Dr. Khan.  [facility] [place] assist in medical management post-operatively.  Pt seen and evaluated.  Denies chest pain, palpitations and SOB.  Resting comfortably on examination.  VTE prophylaxis and pain management deferred to attending.  Lives at home. Pt is a full code.      PAST MEDICAL AND SURGICAL HISTORY      Past Medical History:   Diagnosis Date   • Arthritis     hips, hands and shoulder   • Duodenal ulcer     past hx   • Hypertension    • Osteoarthritis of left hip    • Spinal stenosis, lumbar        Past Surgical History:   Procedure Laterality Date   • APPENDECTOMY     • CHOLECYSTECTOMY     • COLONOSCOPY     • ESOPHAGOGASTRODUODENOSCOPY     • JOINT REPLACEMENT Bilateral     knees   • JOINT REPLACEMENT Left     hip   • TONSILLECTOMY         MEDICATIONS      Prior to Admission medications    Medication Sig Start Date End Date Taking? Authorizing Provider   ALPRAZolam (XANAX) 0.5 mg tablet Take 0.5 mg by mouth nightly.   Yes Sis Milan MD   amLODIPine (NORVASC) 5 mg tablet Take 5 mg by mouth nightly.   Yes Sis Milan MD   lisinopriL (PRINIVIL) 10 mg tablet Take 10 mg by mouth every evening.   Yes Sis Milan MD   omeprazole 20 mg tablet Take 20 mg by mouth daily before breakfast.   Yes Sis Milan MD   traMADoL (ULTRAM) 50 mg tablet Take 50 mg by mouth every 6 (six) hours as needed for moderate pain.   Yes Sis Milan MD   zolpidem (AMBIEN) 10 mg tablet Take 10 mg by mouth nightly.   Yes Sis Milan MD       ALLERGIES      Patient has no known  allergies.    FAMILY HISTORY      History reviewed. No pertinent family history.    SOCIAL HISTORY      Social History     Socioeconomic History   • Marital status:      Spouse name: None   • Number of children: None   • Years of education: None   • Highest education level: None   Occupational History   • None   Tobacco Use   • Smoking status: Former Smoker     Types: Cigarettes     Quit date: 1960     Years since quittin.7   • Smokeless tobacco: Never Used   • Tobacco comment: 2pcks/ 12 yrs   Substance and Sexual Activity   • Alcohol use: Never   • Drug use: Never   • Sexual activity: None   Other Topics Concern   • None   Social History Narrative   • None     Social Determinants of Health     Financial Resource Strain:    • Difficulty of Paying Living Expenses:    Food Insecurity:    • Worried About Running Out of Food in the Last Year:    • Ran Out of Food in the Last Year:    Transportation Needs:    • Lack of Transportation (Medical):    • Lack of Transportation (Non-Medical):    Physical Activity:    • Days of Exercise per Week:    • Minutes of Exercise per Session:    Stress:    • Feeling of Stress :    Social Connections:    • Frequency of Communication with Friends and Family:    • Frequency of Social Gatherings with Friends and Family:    • Attends Oriental orthodox Services:    • Active Member of Clubs or Organizations:    • Attends Club or Organization Meetings:    • Marital Status:    Intimate Partner Violence:    • Fear of Current or Ex-Partner:    • Emotionally Abused:    • Physically Abused:    • Sexually Abused:        REVIEW OF SYSTEMS        Review of Systems  Constitutional: Negative for fatigue and unexpected weight change.   Eyes: Negative for visual disturbance. Mouth no sore throat  Respiratory: Negative for cough and shortness of breath.    Cardiovascular: Negative for chest pain and palpitations.   Gastrointestinal: Negative for abdominal pain, constipation, diarrhea, nausea and  vomiting.   Genitourinary: Negative for difficulty urinating. no hematuria no frequency no urgency no discharge  Musculoskeletal: Negative for arthralgias.   Skin: Negative for rash.   Neurological: Negative for dizziness and headaches.   Hematological: Does not bruise/bleed easily.   Psychiatric/Behavioral: Negative for confusion and dysphoric mood no suicidal ideations          PHYSICAL EXAMINATION      Temp:  [36.2 °C (97.2 °F)-36.6 °C (97.8 °F)] 36.3 °C (97.3 °F)  Heart Rate:  [] 96  Resp:  [10-62] 18  BP: (100-156)/() 125/57  Body mass index is 21.02 kg/m².    General exam : appears age stated, well nourished, not in distress  Head: atraumatic, normocephalic  Eyes : PERRLA, EOMI, no pallor, no icterus  ENT: no lesions, oropharynx pink, mucous membranes moist   Neck: supple, no Lymph nodes, no Thyromegaly, no JVD   CVS : normal rate, normal rhythm, S1 and S2 heard, no murmurs, rubs or gallops  Resp:normal accessory muscle usage, clear to auscultation Bilaterally  Abdomen : soft, Nt, BS +, no organomegaly   Extremities : no edema, no cyanosis   MSK: no DJD, no joint swellings, no joint tenderness   Skin: intact, warm, no rash  Neuro: AAO x3, CN 2-12 intact,  motor strength in all extremities, sensations, DTRs, coordination intact.  Psych: normal mood.cooperative      LABS / IMAGING / EKG        Labs  CBC Results       08/27/21                          1127           WBC 4.70           RBC 4.61           HGB 15.0           HCT 44.3           MCV 96.1           MCH 32.5           MCHC 33.9                                    CMP Results       08/27/21                          1127                      K 4.7           Cl 102           CO2 22           Glucose 87           BUN 15           Creatinine 0.9           Calcium 9.5           Anion Gap 10           AST 30           ALT 23           Albumin 3.8           EGFR >60.0                           Troponin I Results    No lab values to  display.       Microbiology Results     Procedure Component Value Units Date/Time    COVID-19 PAT/Pre-procedural [[id number]]  (Normal) Collected: 08/27/21 1127    Specimen: Nasopharyngeal Swab from Nasopharynx Updated: 08/27/21 2342    Narrative:      The following orders were created for panel order COVID-19 PAT/Pre-procedural.  Procedure                               Abnormality         Status                     ---------                               -----------         ------                     SARS-CoV-2 (COVID-19), PCR[[id number]]   Normal              Final result                 Please view results for these tests on the individual orders.    SARS-CoV-2 (COVID-19), PCR [[id number]]  (Normal) Collected: 08/27/21 1127    Specimen: Nasopharyngeal Swab from Nasopharynx Updated: 08/27/21 2342     SARS-CoV-2 (COVID-19) Negative        UA Results    No lab values to display.         Imaging  X-RAY PELVIS 1 OR 2 VIEWS   Final Result   IMPRESSION: Right hip prosthesis in place.      COMMENT: An AP view of the pelvis was performed.  The upper pelvis is not   included on this image.      We have no preoperative studies for comparison.      A right hip prosthesis has been placed which appears to be in anatomic position.   There is 7 mm lucency between the acetabular component and the native   acetabulum which is presumably postsurgical.  There are postsurgical changes in   the soft tissues.      A left hip prosthesis is in good position.            ECG/Telemetry      ASSESSMENT AND PLAN         Restless legs syndrome (RLS)  Assessment & Plan  Takes xanax for restless leg syndrome.  Reports it is the only thing that works for him.      Plan: Continue xanax           Fall precautions    Duodenal ulcer  Assessment & Plan  Takes Protonix at home daily - controlled  Plan: continue Protonix               Insomnia  Assessment & Plan  Longstanding use of Ambien at home for insomnia    Plan: Continue Ambien for sleeplessness            Fall precautions    Hypertension  Assessment & Plan  Takes amlodipine, lisinopril at home  - systolic blood pressures 125 - 156 post operatively    Plan: Continue amlodipine, lisinopril           Monitor vital signs        VTE Assessment: Padua VTE Score: 6  VTE Prophylaxis Plan: prophylaxis deferred to attending  Code Status: Full Code       LALO Rubio  9/1/2021

## 2021-09-01 NOTE — PROGRESS NOTES
OCCUPATIONAL THERAPY ACUTE CARE - INITIAL EVALUATION     Patient:  Neno Araujo  Location:  [address]  MRN:  [id number]  Today's date:  2021    Neno is a 88 y.o. male admitted on 2021 with Osteoarthritis of right hip, unspecified osteoarthritis type [M16.11]  Arthritis of hip [M16.10]. Principal problem is R JOSE.  Past Medical History  Neno has a past medical history of Arthritis, Duodenal ulcer, Hypertension, Osteoarthritis of left hip, and Spinal stenosis, lumbar.    History of Present Illness  21: right total  hip  replacement. WBAT. Hip precautions      Session details: initial evaluation   occupational therapy    Start time:   803  End time:  841  Time ca min    General Observations  Start: Pt received supine in bed; he was agreeable to therapy and discussed with nurse who was agreeable for patient participation   End: Pt reclined in chair at end of session; call bell in reach, all needs met, pt in NAD, nurse notified, posey chair alarm activated and personal items accessible    Precautions:   fall, hip (.) and weight bearing (left lower extremity weight-bearing as tolerated (WBAT) and right lower extremity weight-bearing as tolerated (WBAT))      VITALS     OT Vitals    Date/Time Pulse HR Source O2 Therapy BP BP Location BP Method Pt Position [facility]   21 0803 94 Monitor None (Room air) 127/72 Right upper arm Automatic Lying CLG   21 0840 97 Monitor None (Room air) 126/69 Right upper arm Automatic Sitting CLG      OT Pain    Date/Time Pain Type Location Rating: Rest Rating: Activity Interventions [facility]   21 0803 Pain Assessment hip 4 4 position adjusted CLG   21 0840 Post Activity -- 0 0 -- CLG          PRIOR LEVEL OF FUNCTION AND LIVING ENVIRONMENT     Prior Level of Function      Most Recent Value   Dominant Hand  right   Ambulation  assistive equipment   Transferring  assistive equipment   Toileting  independent   Bathing  independent   Dressing   independent   Eating  independent   Communication  understands/communicates without difficulty   Prior Level of Function Comment  pt reports independnet for fxl mobility with SPC recenlty 2/2 pain otherwise no AD. Independent for ADLs   Assistive Device Currently Used at Home  cane, straight          Prior Living Environment      Most Recent Value   Current Living Arrangements  home/apartment/condo   Living Environment Comment  Pt reports living in 2 story house with spouse 0 steps to enter. 1/2 bath first floor. full bath/bedroom upstairs with full flight.          Occupational Profile      Most Recent Value   Reason for Services/Referral  impaired ADLs/ functional mobilty   Successful Occupations  retired   Patient Goals  go home          OBJECTIVE     Cognition   Affect/MentalStatus: WFL  Orientation: oriented x 4  Follow Commands: WFL  Cognitive Function: executive function deficit: minimal deficit (insight/awareness of deficits) and safety deficit: severe deficit (insight into deficits/self-awareness, safety precautions awareness and safety precautions follow-through)    Sensory  Vision (impairment/limitations): WFL and corrective lenses for reading  Sensation: sensation intact and UE sensation intact  Hearing: WFL    Motor Skills: WFL    Upper Extremity   Range of Motion (AROM/PROM) Strength (MMT)   RUE WFL WFL   LUE WFL WFL       Balance   Static Dynamic   Sitting Fair (maintains balance unsupported without LOB and without UE support) Fair (CGA/SBA; unsupported and minimal difficulty crossing midline without LOB)   Standing Fair - (maintains balance with UE support or CGA) Poor + (min A to maintain balance or right self; unable to weight shift)   General Comments: Functional Reach Test  Trial One: Functional Reach Test (in): 0 inches  Trial Two: Functional Reach Test (in): 0 inches  Average (in): 0 inches Balance functionally assessed during ADL's with use of RW while standing       Functional Transfers    [place] [facility] DME / AD Cues / Comments   Supine to Sit close supervision none Cuing for hip precaution    Sit to Stand close supervision walker (front-wheeled) From bed,  Cuing for hand placement    Stand to Sit close supervision walker (front-wheeled) To chair    Toilet  MIN A X1  walker, front-wheeled In bathroom   Functional mobility in room/ bathroom with MIN A using RW for stability.     Activities of Daily Living   [place] Cues / DME / Comments   Bathing:   UB                      LB       Education on hip precaution and use of long handled sponge.    Dressing: UB                      LB independent Pull over shirt    minimum assist Patient education with demonstration on adaptive equipment for lower body dressing (reacher, sock aid, dressing stick, and long handled shoe horn) while seated. Pt verbalized understanding and perform LB dressing with use of AE   Underpants, pants, bilateral socks. Pt required hand over hand guidance to use AE.    Grooming/hygiene minimum assist MIN A for stability while standing at sink   Toileting minimum assist Standing a toilet, MIN A for stability         AM-PAC ™ - ADL (Current Function)     Putting on/taking off regular LB clothing 3 - A Little   Bathing 3 - A Little   Toileting 3 - A Little   Putting on/taking off regular UB clothing 4 - None   Help for taking care of personal grooming 4 - None   Eating meals 4 - None   AM-PAC ™ ADL Score 21     EDUCATION     Education Documentation  Self-Care, taught by Marga Rai, OT at 9/1/2021 11:51 AM.  Learner: Patient  Readiness: Acceptance  Method: Explanation  Response: Verbalizes Understanding, Needs Reinforcement, Demonstrated Understanding      Role of OT/ goals, safe bed mobility, safe functional mobility using RW, use of AE . Energy conservation techniques /pursed lip breathing. Use of call light for assist and purpose of chair alarm.       ASSESSMENT AND RECOMMENDATIONS     Progress Summary     OT  evaluation complete. ADL [facility] 21. Patient presents with functional limitations affecting areas of ADL’s and functional transfers. Currently, patient performs bed mobilty supervision, functional mobility MIN A X1 assist using RW, MIN A for LB dressing, and MIN A For grooming/ toileting in bathroom. Pt limited by overall balance. . Pt would benefit from skilled OT services to  maximize safety and independence with daily tasks. Anticipate d/c to home with OT HH     Therapy Plan  Rehab potential:  good, to achieve stated therapy goals  Therapy frequency:  3-5 times/wk  Therapy interventions:  activity tolerance training, adaptive equipment training, BADL retraining, IADL retraining, functional balance retraining, strengthening exercise, transfer/mobility retraining    Discharge Plan  Recommended discharge:  home with assistance, home with home health  Anticipated equipment needs:  walker, front-wheeled    Patient/Family Therapy Goal Statement: go home    OT Goals      Most Recent Value   Transfer Goal 1   Activity/Assistive Device  toilet at 09/01/2021 0803   Mathews  modified independence at 09/01/2021 0803   Time Frame  by discharge at 09/01/2021 0803   Progress/Outcome  goal ongoing at 09/01/2021 0803   Dressing Goal 2   Activity/Adaptive Equipment  dressing skills, all at 09/01/2021 0803   Mathews  modified independence at 09/01/2021 0803   Time Frame  by discharge at 09/01/2021 0803   Progress/Outcome  goal ongoing at 09/01/2021 0803   Toileting Goal 1   Activity/Assistive Device  toileting skills, all at 09/01/2021 0803   M[place]ws  modified independence at 09/01/2021 0803   Time Frame  by discharge at 09/01/2021 0803   Progress/Outcome  goal ongoing at 09/01/2021 0803   Precaution Goal 1   Activity  hip precautions at 09/01/2021 0803   M[place]ws/Cues  independently at 09/01/2021 0803   Time Frame  by discharge at 09/01/2021 0803   Progress/Outcome  goal ongoing at 09/01/2021 0803

## 2021-09-01 NOTE — PROGRESS NOTES
Patient: Neno Araujo  Location:  [facility] [address]  MRN:  [id number]  Today's date:  9/1/2021 Pt. In the chair alarm on call bell in hand     Neno is a 88 y.o. male admitted on 8/31/2021 with Osteoarthritis of right hip, unspecified osteoarthritis type [M16.11]  Arthritis of hip [M16.10]. Principal problem is R JOSE.  Past Medical History  Neno has a past medical history of Arthritis, Duodenal ulcer, Hypertension, Osteoarthritis of left hip, and Spinal stenosis, lumbar.    History of Present Illness  8/31/21: right total  hip  replacement. WBAT. Hip precautions      PT Vitals    Date/Time Pulse [facility]   09/01/21 1200 70 TAMARA      PT Pain    Date/Time Pain Type Rating: Rest Rating: Activity [facility]   09/01/21 1200 Pain Assessment 5 5 TAMARA          Prior Living Environment      Most Recent Value   Current Living Arrangements  home/apartment/condo   Living Environment Comment  Pt reports living in 2 story house with spouse 0 steps to enter. 1/2 bath first floor. full bath/bedroom upstairs with full flight.        Prior Level of Function      Most Recent Value   Dominant Hand  right   Ambulation  assistive equipment   Transferring  assistive equipment   Toileting  independent   Bathing  independent   Dressing  independent   Eating  independent   Communication  understands/communicates without difficulty   Prior Level of Function Comment  pt reports independnet for fxl mobility with SPC recenlty 2/2 pain otherwise no AD. Independent for ADLs   Assistive Device Currently Used at Home  cane, straight          PT Evaluation and Treatment - 09/01/21 1200        PT Time Calculation    Start Time  1200     Stop Time  1213     Time Calculation (min)  13 min        Session Details    Document Type  daily treatment/progress note     Mode of Treatment  physical therapy        General Information    Patient/Family/Caregiver Comments/Observations  Daughter wants to have patient to go home and Pt. wants to be seen again       General Observations of Patient  Pt. in the chair         Weight-bearing Status    Right LE Weight-Bearing Status  weight-bearing as tolerated (WBAT)        Sit to Stand Transfer    Brooks, Sit to Stand Transfer  minimum assist (75% or more patient effort);2 person assist     Assistive Device  walker, front-wheeled     Comment  Pt breaking hip precautions and daughter  relizes she is not ready to Leave  standing three times         Stand to Sit Transfer    Brooks, Stand to Sit Transfer  minimum assist (75% or more patient effort);2 person assist     Assistive Device  walker, front-wheeled     Comment   of balance reaching back to the chair          Gait Training    Brooks, Gait  minimum assist (75% or more patient effort);2 person assist     Assistive Device  walker, front-wheeled     Distance in Feet  8 feet     Comment (Gait/Stairs)  Pt unsteady back to the chair to sit         Coping    Observed Emotional State  cooperative;anxious    impulsive       AM-PAC (TM) - Mobility (Current Function)    Turning from your back to your side while in a flat bed without using bedrails?  2 - A Lot     Moving from lying on your back to sitting on the side of a flat bed without using bedrails?  2 - A Lot     Moving to and from a bed to a chair?  3 - A Little     Standing up from a chair using your arms?  3 - A Little     To walk in a hospital room?  3 - A Little     Climbing 3-5 steps with a railing?  1 - Total     AM-PAC (TM) Mobility Score  14                       Education Documentation  Post op Program Education, taught by Juan Infante PTA at 9/1/2021  3:32 PM.  Learner: Caregiver  Readiness: Acceptance  Method: Explanation  Response: Verbalizes Understanding  Comment: Transfers    Post op Program Education, taught by Juan Infante PTA at 9/1/2021  3:32 PM.  Learner: Patient  Readiness: Acceptance  Method: Explanation  Response: Verbalizes Understanding  Comment: Transfers    Post op Program  Education, taught by Juan Infante PTA at 9/1/2021  3:23 PM.  Learner: Patient  Readiness: Acceptance  Method: Explanation  Response: Verbalizes Understanding, Needs Reinforcement  Comment: Hip precautions          PT Assessment/Plan      Most Recent Value   Daily Outcome Statement  [facility] Daughter came in after AM session for education and training  Daughter  sees Patient needs more time in acute care setting.   will benefit from continued PT  at 09/01/2021 1200   PT Recommended Discharge Disposition  home with home health, home with assistance, skilled nursing facility at 09/01/2021 1200   Rehab Potential  good, to achieve stated therapy goals at 09/01/2021 1200   Therapy Frequency  5 times/wk at 09/01/2021 1200   Planned Therapy Interventions  balance training, gait training at 09/01/2021 1200   Anticipated Equipment Needs at Discharge (PT)  -- [TBD] at 09/01/2021 1200   Patient/Family Therapy Goals Statement  to go home at 08/31/2021 1644          PT Goals      Most Recent Value   Bed Mobility Goal 1   Activity/Assistive Device  bed mobility activities, all at 08/31/2021 1644   Henry  modified independence at 08/31/2021 1644   Time Frame  by discharge at 08/31/2021 1644   Transfer Goal 1   Activity/Assistive Device  all transfers, walker, front-wheeled at 08/31/2021 1644   Henry  supervision required at 08/31/2021 1644   Time Frame  by discharge at 08/31/2021 1644   Gait Training Goal 1   Activity/Assistive Device  gait (walking locomotion), assistive device use at 08/31/2021 1644   Henry  supervision required at 08/31/2021 1644   Distance  150 at 08/31/2021 1644   Time Frame  by discharge at 08/31/2021 1644   Stairs Goal 1   Activity/Assistive Device  stairs, all skills, assistive device use at 08/31/2021 1644   Henry  supervision required at 08/31/2021 1644   Number of Stairs  12 at 08/31/2021 1644   Time Frame  by discharge at 08/31/2021 1644

## 2021-09-01 NOTE — PROGRESS NOTES
Patient: Neno Araujo  Location:  [facility] 4B 4229  MRN:  [id number]  Today's date:  9/1/2021 Pt. In the chair alarm on call bell in hand      Neno is a 88 y.o. male admitted on 8/31/2021 with Osteoarthritis of right hip, unspecified osteoarthritis type [M16.11]  Arthritis of hip [M16.10]. Principal problem is R JOSE.  Past Medical History  Neno has a past medical history of Arthritis, Duodenal ulcer, Hypertension, Osteoarthritis of left hip, and Spinal stenosis, lumbar.    History of Present Illness  8/31/21: right total  hip  replacement. WBAT. Hip precautions      PT Vitals    Date/Time Pulse SpO2 BP [facility]   09/01/21 1042 104 activity  95 % 127/72 TAMARA      PT Pain    Date/Time Pain Type Side/Orientation Location Rating: Rest Rating: Activity Interventions [facility]   09/01/21 1042 Pain Assessment right hip 5 5 position adjusted TAMARA          Prior Living Environment      Most Recent Value   Current Living Arrangements  home/apartment/condo   Living Environment Comment  Pt reports living in 2 story house with spouse 0 steps to enter. 1/2 bath first floor. full bath/bedroom upstairs with full flight.        Prior Level of Function      Most Recent Value   Dominant Hand  right   Ambulation  assistive equipment   Transferring  assistive equipment   Toileting  independent   Bathing  independent   Dressing  independent   Eating  independent   Communication  understands/communicates without difficulty   Prior Level of Function Comment  pt reports independnet for fxl mobility with SPC recenlty 2/2 pain otherwise no AD. Independent for ADLs   Assistive Device Currently Used at Home  cane, straight          PT Evaluation and Treatment - 09/01/21 1042        PT Time Calculation    Start Time  1042     Stop Time  1106     Time Calculation (min)  24 min        Session Details    Document Type  daily treatment/progress note     Mode of Treatment  physical therapy        General Information    Existing  Precautions/Restrictions  fall;hip;weight bearing     Limitations/Impairments  safety/cognitive        Sit to Stand Transfer    [place], Sit to Stand Transfer  moderate assist (50-74% patient effort);1 person assist     Assistive Device  walker, front-wheeled     Comment  Off balance standing stood 6 times breaking hip precautions        Stand to Sit Transfer    [place], Stand to Sit Transfer  moderate assist (50-74% patient effort);1 person assist     Assistive Device  walker, front-wheeled     Comment  sitting breaking hip precautions not following  cueing needed to be blocked to avoid excessive hip flexion with sitting        Gait Training    [place], Gait  minimum assist (75% or more patient effort);1 person assist     Assistive Device  walker, front-wheeled     Distance in Feet  80 feet     Pattern (Gait)  step-to;step-through     Advanced Gait Activity  10 Meter Walk Test (Self-Selected Velocity)     Comment (Gait/Stairs)  antalgic shakey difficulity following step to gait .        10 Meter Walk Test (Self-Selected Velocity)    Trial One: Ten Meter Walk Test (sec)  96 seconds     Trial Two: Ten Meter Walk Test (sec)  75 seconds     Trial One: Gait Speed (m/s)  0.06 m/s     Trial Two: Gait Speed (m/s)  0.08 m/s     Average Gait Speed (m/s): Two Trials  0.07 m/s        Balance    Static Sitting Balance  WFL     Dynamic Sitting Balance  WFL     Sit to Stand Dynamic Balance  mild impairment     Static Standing Balance  mild impairment     Dynamic Standing Balance  moderate impairment     Comment, Balance  tremulous and difficuoity following cueing.         Therapeutic Exercise    Therapeutic Exercise  lower extremity        Lower Extremity (Therapeutic Exercise)    Exercise Position/Type  AROM (active range of motion)     General Exercise  quad sets;gluteal sets;heel slides;LAQ (long arc quad);knee flexion     Reps and Sets  x5     Comment  follows        AM-PAC (TM) - Mobility (Current Function)     Turning from your back to your side while in a flat bed without using bedrails?  2 - A Lot     Moving from lying on your back to sitting on the side of a flat bed without using bedrails?  2 - A Lot     Moving to and from a bed to a chair?  3 - A Little     Standing up from a chair using your arms?  2 - A Lot     To walk in a hospital room?  3 - A Little     Climbing 3-5 steps with a railing?  2 - A Lot     AM-PAC (TM) Mobility Score  14                       Education Documentation  Post op Program Education, taught by Juan Infante PTA at 9/1/2021  3:23 PM.  Learner: Patient  Readiness: Acceptance  Method: Explanation  Response: Verbalizes Understanding, Needs Reinforcement  Comment: Hip precautions          PT Assessment/Plan      Most Recent Value   Daily Outcome Statement  [facility] 14 does not adequately reflect safety with gait  will need cont PT if going home. spoke to PT  may need SNF if does not improve.  at 09/01/2021 1042   PT Recommended Discharge Disposition  home with home health, home with assistance, skilled nursing facility at 09/01/2021 1042   Rehab Potential  good, to achieve stated therapy goals at 09/01/2021 1042   Therapy Frequency  5 times/wk at 09/01/2021 1042   Planned Therapy Interventions  balance training, gait training, transfer training, stair training at 09/01/2021 1042   Anticipated Equipment Needs at Discharge (PT)  none at 09/01/2021 1042   Patient/Family Therapy Goals Statement  to go home at 08/31/2021 1644          PT Goals      Most Recent Value   Bed Mobility Goal 1   Activity/Assistive Device  bed mobility activities, all at 08/31/2021 1644   [place]  modified independence at 08/31/2021 1644   Time Frame  by discharge at 08/31/2021 1644   Transfer Goal 1   Activity/Assistive Device  all transfers, walker, front-wheeled at 08/31/2021 1644   [place]  supervision required at 08/31/2021 1644   Time Frame  by discharge at 08/31/2021 1644   Gait Training Goal 1    Activity/Assistive Device  gait (walking locomotion), assistive device use at 08/31/2021 1644   [place]  supervision required at 08/31/2021 1644   Distance  150 at 08/31/2021 1644   Time Frame  by discharge at 08/31/2021 1644   Stairs Goal 1   Activity/Assistive Device  stairs, all skills, assistive device use at 08/31/2021 1644   [place]  supervision required at 08/31/2021 1644   Number of Stairs  12 at 08/31/2021 1644   Time Frame  by discharge at 08/31/2021 1644

## 2021-09-01 NOTE — ASSESSMENT & PLAN NOTE
Longstanding use of Ambien at home for insomnia    Plan: Continue Ambien for sleeplessness           Fall precautions

## 2021-09-01 NOTE — PROGRESS NOTES
1141- Met with pt to discuss discharge plan.  Pt lives at home with his wife.  Pt is agreeable to [facility].  Spoke with [facility], does not go out to [place].  Referral sent to [facility], waiting on response.  Babrie Castaneda RN

## 2021-09-01 NOTE — PLAN OF CARE
Problem: Adult Inpatient Plan of Care  Goal: Plan of Care Review  Outcome: Progressing  Flowsheets (Taken 9/1/2021 1157)  Progress: improving  Plan of Care Reviewed With: patient  Outcome Summary: OT evaluation complete. [facility]. Patient presents with functional limitations affecting areas of ADL’s and functional transfers. Currently, patient performs bed mobilty supervision, functional mobility MIN A X1 assist using RW, MIN A for LB dressing, and MIN A For grooming/ toileting in bathroom. Pt limited by overall balance. . Pt would benefit from skilled OT services to  maximize safety and independence with daily tasks. Anticipate d/c to home with OT HH

## 2021-09-01 NOTE — ASSESSMENT & PLAN NOTE
Takes xanax for restless leg syndrome.  Reports it is the only thing that works for him.      Plan: Continue xanax           Fall precautions

## 2021-09-01 NOTE — UM PHYSICIAN REVIEW NOTE
Inpatient appropriate for this patient requiring greater than two midnight stay for continued care after JOSE

## 2021-09-02 VITALS
TEMPERATURE: 97.5 F | HEART RATE: 62 BPM | BODY MASS INDEX: 21.02 KG/M2 | WEIGHT: 155 LBS | OXYGEN SATURATION: 96 % | RESPIRATION RATE: 18 BRPM | DIASTOLIC BLOOD PRESSURE: 74 MMHG | SYSTOLIC BLOOD PRESSURE: 118 MMHG

## 2021-09-02 PROBLEM — M16.11 OSTEOARTHRITIS OF RIGHT HIP: Status: ACTIVE | Noted: 2021-09-02

## 2021-09-02 PROCEDURE — 12000000 HC ROOM AND CARE MED/SURG

## 2021-09-02 PROCEDURE — 97530 THERAPEUTIC ACTIVITIES: CPT | Mod: GP,CQ

## 2021-09-02 PROCEDURE — 63700000 HC SELF-ADMINISTRABLE DRUG: Performed by: HOSPITALIST

## 2021-09-02 PROCEDURE — 63600000 HC DRUGS/DETAIL CODE: Performed by: ORTHOPAEDIC SURGERY

## 2021-09-02 PROCEDURE — 63700000 HC SELF-ADMINISTRABLE DRUG: Performed by: ORTHOPAEDIC SURGERY

## 2021-09-02 PROCEDURE — 99233 SBSQ HOSP IP/OBS HIGH 50: CPT | Performed by: HOSPITALIST

## 2021-09-02 PROCEDURE — 97535 SELF CARE MNGMENT TRAINING: CPT | Mod: GO

## 2021-09-02 RX ADMIN — TRAMADOL HYDROCHLORIDE 50 MG: 50 TABLET, FILM COATED ORAL at 13:40

## 2021-09-02 RX ADMIN — DEXAMETHASONE SODIUM PHOSPHATE 10 MG: 4 INJECTION, SOLUTION INTRA-ARTICULAR; INTRALESIONAL; INTRAMUSCULAR; INTRAVENOUS; SOFT TISSUE at 08:51

## 2021-09-02 RX ADMIN — ASPIRIN 81 MG CHEWABLE TABLET 81 MG: 81 TABLET CHEWABLE at 09:48

## 2021-09-02 RX ADMIN — TRAMADOL HYDROCHLORIDE 50 MG: 50 TABLET, FILM COATED ORAL at 08:53

## 2021-09-02 RX ADMIN — ZOLPIDEM TARTRATE 5 MG: 5 TABLET ORAL at 00:02

## 2021-09-02 RX ADMIN — KETOROLAC TROMETHAMINE 15 MG: 15 INJECTION, SOLUTION INTRAMUSCULAR; INTRAVENOUS at 11:41

## 2021-09-02 RX ADMIN — TRAMADOL HYDROCHLORIDE 50 MG: 50 TABLET, FILM COATED ORAL at 04:17

## 2021-09-02 RX ADMIN — PANTOPRAZOLE SODIUM 20 MG: 20 TABLET, DELAYED RELEASE ORAL at 08:53

## 2021-09-02 RX ADMIN — DOCUSATE SODIUM AND SENNOSIDES 1 TABLET: 8.6; 5 TABLET, FILM COATED ORAL at 08:53

## 2021-09-02 ASSESSMENT — COGNITIVE AND FUNCTIONAL STATUS - GENERAL
HELP NEEDED FOR BATHING: 3 - A LITTLE
DRESSING REGULAR LOWER BODY CLOTHING: 3 - A LITTLE
STANDING UP FROM CHAIR USING ARMS: 3 - A LITTLE
CLIMB 3 TO 5 STEPS WITH RAILING: 1 - TOTAL
EATING MEALS: 4 - NONE
DRESSING REGULAR UPPER BODY CLOTHING: 4 - NONE
HELP NEEDED FOR PERSONAL GROOMING: 4 - NONE
WALKING IN HOSPITAL ROOM: 4 - NONE
MOVING TO AND FROM BED TO CHAIR: 3 - A LITTLE
TOILETING: 3 - A LITTLE

## 2021-09-02 NOTE — PATIENT CARE CONFERENCE
Care Progression Rounds Note  Date: 9/2/2021  Time: 10:46 AM     Patient Name: Neno Araujo     Medical Record Number: [id number]   YOB: 1933  Sex: Male      Room/Bed: 4229    Admitting Diagnosis: Osteoarthritis of right hip, unspecified osteoarthritis type [M16.11]  Arthritis of hip [M16.10]   Admit Date/Time: 8/31/2021  8:21 AM    Primary Diagnosis: Osteoarthritis of right hip  Principal Problem: Osteoarthritis of right hip    GMLOS: pending  Anticipated Discharge Date: 9/2/2021    AM-PAC:  Mobility Score: 14    Discharge Planning:  Current Living Arrangements: home/apartment/condo    Barriers to Discharge:  Barriers to Discharge: Medical issues not resolved    Participants:  nursing, social work/services

## 2021-09-02 NOTE — DISCHARGE INSTRUCTIONS
You are set up with [facility]. They will contact you at home to arrange first visit in the home. They can be reached at [phone].      TOTAL HIP    MEDICATION:    If you are taking Aspirin:    Enteric coated aspirin 2 times a day for blood clot prevention and take for 6 weeks.  May use over-the-counter Pepcid or Zantac if stomach upset occurs.    PAIN CONTROL:    You will be given a prescription for pain medication and an anti-inflammatory. Take your pain medicine as prescribed with food if possible. You can expect to have pain during the healing process from the incision and it will improve.     Use anti-inflammatories everyday until you see your doctor unless otherwise instructed. Opioid pain reliever is to be taken only as needed for pain.    DO NOT DRIVE WHILE TAKING PAIN MEDICATION.    Some patients find that they have some difficulty with constipation after surgery. This is due to a combination of things. Most of this is due to the pain medication you are taking. The pain medications, along with a decrease in activity, can sometimes lead to discomfort from constipation. You should eat plenty of fresh fruits, vegetables, drink fruit juices and drink plenty of water.    INCISION CARE:    Look at incision every day. Keep incision clean and dry.    Maintain Mepilex dressing for 5 days total.    Leave steri-strips on until they fall off, if you have them.    Your staples or stitches will be removed about 18-24 days after surgery. Your incision will heal and the swelling and bruising will get better over the next 3 weeks.    If you have glue closing your incision, do not pull or pick off it will dissolve naturally.    No tub baths, swimming, Jacuzzi tubs or any other activity that would require submersion of your incision in water. Ok to shower.    Call your doctor if you notice any of the following:  Fever over 101.5 degrees  Drainage from incision  Increased swelling around incision  Increased redness  around incision line  Thigh/calf pain or swelling in legs  Chest pain  Chest congestion  Problems with breathing    ACTIVITY:    Maintain Total Hip precautions:    Maintain pillow between knees.  No bending more than 90 degrees at the waist.  Do not bend forward when sitting.   Do not raise or position your knees higher than your hip when sitting.  No crossing your legs.  No turning operative leg inward toward center of your body.    Use walker when ambulating. You may place full weight on your operative leg (unless instructed otherwise).    You may resume sexual activity as tolerated while maintaining hip precautions.    You may progress to a cane when ready.     Balance rest and activity.    DO NOT SMOKE! Smoking is not healthy for your healing process.  No driving for at least 3 weeks. You must also be off prescription pain medications.    *FOR ANY ORTHOPEDIC ISSUES OR CONCERNS THAT NEED TO BE ADDRESSED IN PERSON, YOU MAY REPORT TO THE URGENT CARE LOCATED AT THE [facility] WHICH HAS EVENING AND WEEKEND HOURS, INSTEAD OF REPORTING TO THE EMERGENCY ROOM    FOLLOW UP    You should follow up with Dr. Khan in 3 weeks. Call for an appt. [phone]    “For your and/or your 's information, important details about activity and expectations that were reviewed during your hospital stay can be at found in our Discharge Video overview at [email or web address] “          [facility]: [phone], please call with questions.

## 2021-09-02 NOTE — PROGRESS NOTES
Patient: Neno Araujo  Location:  [facility] 4B 4229  MRN:  [id number]  Today's date:  9/2/2021 Pt in the chair alarm on call bell in hand      Neno is a 88 y.o. male admitted on 8/31/2021 with Osteoarthritis of right hip, unspecified osteoarthritis type [M16.11]  Arthritis of hip [M16.10]. Principal problem is R JOSE.  Past Medical History  Neno has a past medical history of Arthritis, Duodenal ulcer, Hypertension, Osteoarthritis of left hip, and Spinal stenosis, lumbar.    History of Present Illness  8/31/21: right total  hip  replacement. WBAT. Hip precautions      PT Vitals    Date/Time Pulse BP [facility]   09/02/21 1150 62 118/74 TAMARA      PT Pain    Date/Time Pain Type Side/Orientation Location Rating: Rest Rating: Activity [facility]   09/02/21 1150 Pain Assessment right hip 6 6 TAMARA          Prior Living Environment      Most Recent Value   Current Living Arrangements  home/apartment/condo   Living Environment Comment  Pt reports living in 2 story house with spouse 0 steps to enter. 1/2 bath first floor. full bath/bedroom upstairs with full flight.        Prior Level of Function      Most Recent Value   Dominant Hand  right   Ambulation  assistive equipment   Transferring  assistive equipment   Toileting  independent   Bathing  independent   Dressing  independent   Eating  independent   Communication  understands/communicates without difficulty   Prior Level of Function Comment  pt reports independnet for fxl mobility with SPC recenlty 2/2 pain otherwise no AD. Independent for ADLs   Assistive Device Currently Used at Home  cane, straight          PT Evaluation and Treatment - 09/02/21 1150        PT Time Calculation    Start Time  1150     Stop Time  1220     Time Calculation (min)  30 min        Session Details    Document Type  daily treatment/progress note     Mode of Treatment  physical therapy        General Information    Patient Profile Reviewed  yes     General Observations of Patient  Pt. in the chair  needing to use urinal      Existing Precautions/Restrictions  fall;hip;weight bearing     Limitations/Impairments  safety/cognitive        Weight-bearing Status    Right LE Weight-Bearing Status  weight-bearing as tolerated (WBAT)        Bed Mobility    Comment (Bed Mobility)  Declined need sleeping in recliner.        Sit to Stand Transfer    [place], Sit to Stand Transfer  minimum assist (75% or more patient effort);1 person assist     Assistive Device  walker, front-wheeled     Comment  standing 4 times this session with Daughter assisting with verbal cueing from staff.        Stand to Sit Transfer    [place], Stand to Sit Transfer  minimum assist (75% or more patient effort);1 person assist     Verbal Cues  hand placement     Assistive Device  walker, front-wheeled     Comment  Pt needing retraining for standing to sitting  and daughter assisting to sit  to minimize hip flexion.        Gait Training    [place], Gait  minimum assist (75% or more patient effort)     Assistive Device  walker, front-wheeled     Distance in Feet  80 feet     Pattern (Gait)  step-to        10 Meter Walk Test (Self-Selected Velocity)    Trial One: Ten Meter Walk Test (sec)  0 seconds    not tested this session family education     Trial Two: Ten Meter Walk Test (sec)  0 seconds     Trial One: Gait Speed (m/s)  0 m/s     Trial Two: Gait Speed (m/s)  0 m/s     Average Gait Speed (m/s): Two Trials  0 m/s        Balance    Static Sitting Balance  WFL     Dynamic Sitting Balance  mild impairment     Sit to Stand Dynamic Balance  mild impairment     Static Standing Balance  mild impairment     Dynamic Standing Balance  mild impairment     Comment, Balance  Daughter assisting with MIN A all mobility         AM-PAC (TM) - Mobility (Current Function)    Turning from your back to your side while in a flat bed without using bedrails?  2 - A Lot     Moving from lying on your back to sitting on the side of a flat bed without using  bedrails?  2 - A Lot     Moving to and from a bed to a chair?  3 - A Little     Standing up from a chair using your arms?  3 - A Little     To walk in a hospital room?  4 - None     Climbing 3-5 steps with a railing?  1 - Total    O YUN     AM-PAC (TM) Mobility Score  15                       Education Documentation  Post op Program Education, taught by Juan Infante PTA at 9/2/2021 12:43 PM.  Learner: Family  Readiness: Acceptance  Method: Explanation  Response: Verbalizes Understanding, Needs Reinforcement  Comment: Hip precautions. Transfers Gait training min A  for patient wiht gait . All mobiity will need to be min A  Daughter fully instructed on all mobiity and sleeping in recliner.    Post op Program Education, taught by Juan Infante PTA at 9/2/2021 12:43 PM.  Learner: Patient  Readiness: Acceptance  Method: Explanation  Response: Verbalizes Understanding, Needs Reinforcement  Comment: Hip precautions. Transfers Gait training min A  for patient wiht gait . All mobiity will need to be min A  Daughter fully instructed on all mobiity and sleeping in recliner.          PT Assessment/Plan      Most Recent Value   Daily Outcome Statement  [facility] 15 Daughter came in for education instructed on mobiity and hip precautions .  Daughter assisted with all mobiity this session and no further questions this session  at 09/02/2021 1150   PT Recommended Discharge Disposition  home with home health, home with assistance, skilled nursing facility at 09/02/2021 1150   Rehab Potential  good, to achieve stated therapy goals at 09/02/2021 1150   Therapy Frequency  5 times/wk at 09/02/2021 1150   Planned Therapy Interventions  balance training, gait training, transfer training, bed mobility training, stair training at 09/02/2021 1150   Anticipated Equipment Needs at Discharge (PT)  -- [TBD] at 09/01/2021 1200   Patient/Family Therapy Goals Statement  to go home at 08/31/2021 1644          PT Goals      Most Recent Value   Bed  Mobility Goal 1   Activity/Assistive Device  bed mobility activities, all at 08/31/2021 1644   [place]  modified independence at 08/31/2021 1644   Time Frame  by discharge at 08/31/2021 1644   Transfer Goal 1   Activity/Assistive Device  all transfers, walker, front-wheeled at 08/31/2021 1644   [place]  supervision required at 08/31/2021 1644   Time Frame  by discharge at 08/31/2021 1644   Gait Training Goal 1   Activity/Assistive Device  gait (walking locomotion), assistive device use at 08/31/2021 1644   [place]  supervision required at 08/31/2021 1644   Distance  150 at 08/31/2021 1644   Time Frame  by discharge at 08/31/2021 1644   Stairs Goal 1   Activity/Assistive Device  stairs, all skills, assistive device use at 08/31/2021 1644   [place]  supervision required at 08/31/2021 1644   Number of Stairs  12 at 08/31/2021 1644   Time Frame  by discharge at 08/31/2021 1644

## 2021-09-02 NOTE — PROGRESS NOTES
Hospital Medicine Service -  Daily Progress Note       SUBJECTIVE     Interval History: No acute events overnight. R hip pain 6/10. Afebrile. Walking with assistance.      OBJECTIVE        Vital signs in last 24 hours:  Temp:  [36.3 °C (97.4 °F)-36.9 °C (98.5 °F)] 36.4 °C (97.5 °F)  Heart Rate:  [] 99  Resp:  [15-18] 18  BP: (112-138)/(57-86) 119/57  I/O last 3 completed shifts:  In: 1260 [P.O.:960; IV Piggyback:300]  Out: 1705 [Urine:1705]    PHYSICAL EXAMINATION        GEN: well-developed and well-nourished; not in acute distress  HEENT: normocephalic; atraumatic  NECK: no JVD; no bruits  CARDIO: regular rate and rhythm; no murmurs or rubs  RESP: clear to auscultation bilaterally; no rales, rhonchi, or wheezes  ABD: soft, non-distended, non-tender, normal bowel sounds  EXT: no cyanosis, clubbing, or edema  R Hip Incision clean, dry  SKIN: clean, dry, warm, and intact  MUSCULOSKELETAL: no injury or deformity  NEURO: alert and oriented x 3; nonfocal  BEHAVIOR/EMOTIONAL: appropriate; cooperative     LABS / IMAGING / TELE        Labs  Lab Results   Component Value Date    WBC 4.70 08/27/2021    HGB 15.0 08/27/2021    HCT 44.3 08/27/2021    MCV 96.1 08/27/2021     08/27/2021     Lab Results   Component Value Date    GLUCOSE 157 (H) 09/01/2021    CALCIUM 7.8 (L) 09/01/2021     (L) 09/01/2021    K 4.9 09/01/2021    CO2 22 09/01/2021     09/01/2021    BUN 28 (H) 09/01/2021    CREATININE 0.8 09/01/2021     Lab Results   Component Value Date    INR 1.0 08/27/2021       Imaging  X-RAY PELVIS 1 OR 2 VIEWS    Result Date: 8/31/2021  IMPRESSION: Right hip prosthesis in place. COMMENT: An AP view of the pelvis was performed.  The upper pelvis is not included on this image. We have no preoperative studies for comparison. A right hip prosthesis has been placed which appears to be in anatomic position. There is 7 mm lucency between the acetabular component and the native acetabulum which is presumably  postsurgical.  There are postsurgical changes in the soft tissues. A left hip prosthesis is in good position.          ASSESSMENT AND PLAN      Restless legs syndrome (RLS)  Assessment & Plan  Takes xanax for restless leg syndrome.  Reports it is the only thing that works for him.      Plan: Continue xanax           Fall precautions      Duodenal ulcer  Assessment & Plan  Takes Protonix at home daily - controlled  Plan: continue Protonix               Insomnia  Assessment & Plan  Longstanding use of Ambien at home for insomnia    Plan: Continue Ambien for sleeplessness           Fall precautions    Hypertension  Assessment & Plan  Takes amlodipine, lisinopril at home  - systolic blood pressures 125 - 156 post operatively    Plan: Continue amlodipine, lisinopril           Monitor vital signs    Stable    * Osteoarthritis of right hip  Assessment & Plan  S/p R. JOSE by Dr. Lee on 9/1  Stable  SNF Vs Home soon       VTE Assessment: [place] VTE Score: 6  Code Status: Full Code  Estimated discharge date: 9/2/2021     Samir Webster MD  9/2/2021  10:41 AM

## 2021-09-02 NOTE — PROGRESS NOTES
Ortho Daily Progress Note    Subjective     Subjective:   Patient seen and examined at bedside. Patient report no acute events overnight. Pain is well controlled. Patient denies F/C, SOB, CP. Patient has no new complaints at this time.     Objective     Vital signs in last 24 hours:  Temp:  [36.3 °C (97.4 °F)-36.9 °C (98.5 °F)] 36.4 °C (97.5 °F)  Heart Rate:  [] 99  Resp:  [15-18] 18  BP: (112-138)/(57-86) 119/57      Intake/Output Summary (Last 24 hours) at 9/2/2021 0626  Last data filed at 9/2/2021 0542  Gross per 24 hour   Intake 840 ml   Output 1455 ml   Net -615 ml     Intake/Output this shift:  I/O this shift:  In: 720 [P.O.:720]  Out: 775 [Urine:775]    Physical Exam:  RLE:  Dressing clean, dry and intact  Compartments soft and compressible  Sensation intact to light touch tibial, sural, saphenous, superficial peroneal, and deep peroneal nerves  Motor intact ankle plantar/dorsiflexion, great toe flexion/extension  Palpable DP/PT pulses  Foot warm and well-perfused, brisk capillary refill, less than 2 seconds    Assessment/Plan:     88 y.o. y/o male  s/p R JOSE with Dr. Khan DOS: 8/31/2021  -Multimodal pain control  -PT/OT  -WBAT RLE  -Maintain dressing  -Ice  -ASA 81BID for DVT PPx  -Dispo: home likely today

## 2021-09-02 NOTE — NURSING NOTE
RN reviewed discharge paperwork and instructions with patient and wife and daughter who are at bedside, patient dressed and IV access removed, no questions at this time, transport requested, patient sitting up in chair, chair alarm present.

## 2021-09-02 NOTE — PROGRESS NOTES
Patient: Neno Araujo  Location: [facility] 4B 4229  MRN: [id number]  Today's date: 9/2/2021    Attempted to see patient for therapy. Unable due to  .   Attempted to call family for education during PT session  No answer.

## 2021-09-02 NOTE — PROGRESS NOTES
MS[facility] met with pt. Pt requested a commode, as recommended by therapy. Commode provided by [employer] and form faxed to them, after signed by pt and pts doctor.    PLAN: [place] home with [facility] and commode.

## (undated) DEVICE — HOOD FLYTE SURGICOOL

## (undated) DEVICE — ***USE 57698*** SLEEVE FLOWTRON DVT CALF SINGLE USE

## (undated) DEVICE — ADHESIVE SKIN DERMABOND ADVANCED 0.7ML

## (undated) DEVICE — NEEDLE DISP HYPO 18GX1-1/2IN

## (undated) DEVICE — GLOVE PROTEXIS PI ORTHO 9.0

## (undated) DEVICE — DRAPE LARGE REVERSE FOLD

## (undated) DEVICE — PACK RFID HIP ADD ON

## (undated) DEVICE — SPONGE LAP 18X18 SAFE-T RFID ENHANCED XRAY

## (undated) DEVICE — SYRINGE DISP LUER-LOK 30 CC

## (undated) DEVICE — MANIFOLD FOUR PORT NEPTUNE

## (undated) DEVICE — CONTAINER SPECIMEN STERILE 5OZ

## (undated) DEVICE — PENEVAC1 NONSTICK SMOKE EVAC

## (undated) DEVICE — CLOTH PREPPING SAGE 2% CHG 2/PK

## (undated) DEVICE — PAD GROUND ELECTROSURGICAL W/CORD

## (undated) DEVICE — BAG DECANTER

## (undated) DEVICE — GOWN SURG X-LARGE MICROCOOL

## (undated) DEVICE — DRESSING COVADERM 2IN X 2IN

## (undated) DEVICE — PACK UNIVERSAL TOTAL JOINT

## (undated) DEVICE — SOLN IRRIG STER WATER 1000ML

## (undated) DEVICE — APPLICATOR CHLORAPREP 26ML ORANGE TINT

## (undated) DEVICE — SUCTION 18FR FRAZIER DISPOSABLE

## (undated) DEVICE — SUTURE QUILL PDO 2 RX-1066Q

## (undated) DEVICE — SUTURE QUILL PDO 0 RA-1067Q

## (undated) DEVICE — NEEDLE DISP SPINAL 22GX3-1/2IN

## (undated) DEVICE — SOLN IRRIG .9%SOD 1000ML

## (undated) DEVICE — SET HANDPIECE INTERPULSE

## (undated) DEVICE — GLOVE LINER PROTEXIS 9.0 PI BLUE

## (undated) DEVICE — COVER BACK TABLE REINFORCED

## (undated) DEVICE — VEST STERILE

## (undated) DEVICE — GOWN SURGICAL MICROCOOL IMPERVIOUS XXL

## (undated) DEVICE — DRESSING MEPILEX BORDER AG 4X8

## (undated) DEVICE — Device